# Patient Record
Sex: MALE | Race: BLACK OR AFRICAN AMERICAN | NOT HISPANIC OR LATINO | Employment: FULL TIME | URBAN - METROPOLITAN AREA
[De-identification: names, ages, dates, MRNs, and addresses within clinical notes are randomized per-mention and may not be internally consistent; named-entity substitution may affect disease eponyms.]

---

## 2018-05-17 ENCOUNTER — OFFICE VISIT (OUTPATIENT)
Dept: FAMILY MEDICINE | Facility: CLINIC | Age: 43
End: 2018-05-17
Payer: COMMERCIAL

## 2018-05-17 VITALS
BODY MASS INDEX: 26.61 KG/M2 | HEIGHT: 71 IN | DIASTOLIC BLOOD PRESSURE: 104 MMHG | TEMPERATURE: 98 F | SYSTOLIC BLOOD PRESSURE: 152 MMHG | OXYGEN SATURATION: 98 % | HEART RATE: 70 BPM | WEIGHT: 190.06 LBS

## 2018-05-17 DIAGNOSIS — Z13.220 SCREENING CHOLESTEROL LEVEL: ICD-10-CM

## 2018-05-17 DIAGNOSIS — Z13.29 THYROID DISORDER SCREEN: ICD-10-CM

## 2018-05-17 DIAGNOSIS — Z13.0 SCREENING FOR DEFICIENCY ANEMIA: ICD-10-CM

## 2018-05-17 DIAGNOSIS — Z13.1 DIABETES MELLITUS SCREENING: ICD-10-CM

## 2018-05-17 DIAGNOSIS — R01.1 SYSTOLIC MURMUR: ICD-10-CM

## 2018-05-17 DIAGNOSIS — I10 ESSENTIAL HYPERTENSION: Primary | ICD-10-CM

## 2018-05-17 PROCEDURE — 3080F DIAST BP >= 90 MM HG: CPT | Mod: CPTII,S$GLB,, | Performed by: NURSE PRACTITIONER

## 2018-05-17 PROCEDURE — 99203 OFFICE O/P NEW LOW 30 MIN: CPT | Mod: S$GLB,,, | Performed by: NURSE PRACTITIONER

## 2018-05-17 PROCEDURE — 3077F SYST BP >= 140 MM HG: CPT | Mod: CPTII,S$GLB,, | Performed by: NURSE PRACTITIONER

## 2018-05-17 PROCEDURE — 99999 PR PBB SHADOW E&M-EST. PATIENT-LVL IV: CPT | Mod: PBBFAC,,, | Performed by: NURSE PRACTITIONER

## 2018-05-17 PROCEDURE — 3008F BODY MASS INDEX DOCD: CPT | Mod: CPTII,S$GLB,, | Performed by: NURSE PRACTITIONER

## 2018-05-17 RX ORDER — AMLODIPINE BESYLATE 10 MG/1
10 TABLET ORAL DAILY
Qty: 30 TABLET | Refills: 0 | Status: SHIPPED | OUTPATIENT
Start: 2018-05-17 | End: 2018-06-20 | Stop reason: SDUPTHER

## 2018-05-17 RX ORDER — AMLODIPINE BESYLATE 10 MG/1
10 TABLET ORAL DAILY
COMMUNITY
End: 2018-05-17 | Stop reason: SDUPTHER

## 2018-05-17 RX ORDER — LISINOPRIL AND HYDROCHLOROTHIAZIDE 10; 12.5 MG/1; MG/1
1 TABLET ORAL DAILY
Qty: 30 TABLET | Refills: 0 | Status: SHIPPED | OUTPATIENT
Start: 2018-05-17 | End: 2018-06-20 | Stop reason: SDUPTHER

## 2018-05-17 NOTE — PROGRESS NOTES
"Subjective:       Patient ID: Salvador Pierce is a 43 y.o. male.    Chief Complaint: Establish Care and Medication Refill    ####NEW PATIENT####    Patient is a 43-year-old black male with hypertension and systolic murmur that is here today to establish care and get blood pressure check.    Patient has hypertension and currently takes amlodipine 10 mg daily.  Blood pressure is uncontrolled.  BP (!) 152/104   Pulse 70   Temp 98.4 °F (36.9 °C) (Oral)   Ht 5' 11" (1.803 m)   Wt 86.2 kg (190 lb 0.6 oz)   SpO2 98%   BMI 26.50 kg/m²                 Previous Medications    AMLODIPINE (NORVASC) 10 MG TABLET    Take 10 mg by mouth once daily.       Past Medical History:   Diagnosis Date    Hypertension     Systolic murmur     reports noted as a child - had evaluated by cardiologist in the past       History reviewed. No pertinent surgical history.    Family History   Problem Relation Age of Onset    Hypertension Mother     Multiple sclerosis Mother     Diabetes Father     Hypertension Father     No Known Problems Brother        Social History     Social History    Marital status:      Spouse name: N/A    Number of children: N/A    Years of education: N/A     Occupational History    supervisory position      Social History Main Topics    Smoking status: Former Smoker     Packs/day: 1.00     Years: 21.00     Quit date: 2/17/2018    Smokeless tobacco: Never Used    Alcohol use Yes      Comment: every other weekend - 3 mixed drinks per occasion    Drug use: No    Sexual activity: Yes     Partners: Female     Other Topics Concern    None     Social History Narrative    None       Review of Systems   Constitutional: Negative for activity change, appetite change, fatigue, fever and unexpected weight change.   HENT: Negative for congestion, ear pain, mouth sores, nosebleeds, postnasal drip, rhinorrhea, sinus pressure, sneezing, sore throat, trouble swallowing and voice change.    Eyes: Negative.  " "  Respiratory: Negative for cough, chest tightness and shortness of breath.    Cardiovascular: Negative for chest pain, palpitations and leg swelling.   Gastrointestinal: Negative.  Negative for abdominal pain, blood in stool, constipation, diarrhea, nausea and vomiting.   Endocrine: Negative.    Genitourinary: Negative for difficulty urinating, dysuria, flank pain, hematuria and urgency.   Musculoskeletal: Negative for arthralgias, back pain, gait problem, joint swelling, myalgias and neck pain.   Skin: Negative for color change, rash and wound.   Allergic/Immunologic: Negative for immunocompromised state.   Neurological: Negative for dizziness, tremors, seizures, syncope, speech difficulty and headaches.   Hematological: Negative for adenopathy. Does not bruise/bleed easily.   Psychiatric/Behavioral: Negative for behavioral problems, dysphoric mood, sleep disturbance and suicidal ideas. The patient is not nervous/anxious.          Objective:     Vitals:    05/17/18 0819 05/17/18 0837   BP: (!) 160/110 (!) 152/104   BP Location: Left arm    Patient Position: Sitting    BP Method: Medium (Manual)    Pulse: 70    Temp: 98.4 °F (36.9 °C)    TempSrc: Oral    SpO2: 98%    Weight: 86.2 kg (190 lb 0.6 oz)    Height: 5' 11" (1.803 m)           Physical Exam   Constitutional: He is oriented to person, place, and time. He appears well-developed and well-nourished.   HENT:   Head: Normocephalic.   Right Ear: External ear normal.   Left Ear: External ear normal.   Nose: Nose normal.   Mouth/Throat: Oropharynx is clear and moist. No oropharyngeal exudate.   Eyes: EOM are normal. Pupils are equal, round, and reactive to light. Right eye exhibits no discharge. Left eye exhibits no discharge. No scleral icterus.   Neck: Normal range of motion. Neck supple. No tracheal deviation present. No thyromegaly present.   Cardiovascular: Normal rate and regular rhythm.    Murmur heard.   Systolic murmur is present with a grade of 1/6 "   Murmur to right sternal border   Pulmonary/Chest: Effort normal and breath sounds normal. No respiratory distress.   Abdominal: Soft. He exhibits no distension.   Musculoskeletal: Normal range of motion. He exhibits no edema.   Lymphadenopathy:     He has no cervical adenopathy.   Neurological: He is alert and oriented to person, place, and time. Coordination normal.   Skin: Skin is warm and dry. No rash noted.   Psychiatric: He has a normal mood and affect. His behavior is normal.         Assessment:         ICD-10-CM ICD-9-CM   1. Essential hypertension I10 401.9   2. Screening for deficiency anemia Z13.0 V78.1   3. Thyroid disorder screen Z13.29 V77.0   4. Screening cholesterol level Z13.220 V77.91   5. Diabetes mellitus screening Z13.1 V77.1   6. Systolic murmur R01.1 785.2       Plan:       Essential hypertension  -  Start lisinopril HCT 10/12.5 mg daily.  Continue amlodipine 10 mg daily.  Get fasting labs and follow-up in 4 weeks.  -     lisinopril-hydrochlorothiazide (PRINZIDE,ZESTORETIC) 10-12.5 mg per tablet; Take 1 tablet by mouth once daily.  Dispense: 30 tablet; Refill: 0  -     amLODIPine (NORVASC) 10 MG tablet; Take 1 tablet (10 mg total) by mouth once daily.  Dispense: 30 tablet; Refill: 0    Screening for deficiency anemia  -     CBC auto differential; Future; Expected date: 06/07/2018    Thyroid disorder screen  -     TSH; Future; Expected date: 06/07/2018    Screening cholesterol level  -     Lipid panel; Future; Expected date: 06/07/2018    Diabetes mellitus screening  -     Comprehensive metabolic panel; Future; Expected date: 06/07/2018    Systolic murmur  -  Grade 1/6 systolic murmur noted.  According to cardiology notes in 2014, murmur is unchanged.      Follow-up in about 4 weeks (around 6/14/2018) for fasting labs and WELLNESS EXAM.     Patient's Medications   New Prescriptions    LISINOPRIL-HYDROCHLOROTHIAZIDE (PRINZIDE,ZESTORETIC) 10-12.5 MG PER TABLET    Take 1 tablet by mouth once  daily.   Previous Medications    No medications on file   Modified Medications    Modified Medication Previous Medication    AMLODIPINE (NORVASC) 10 MG TABLET amLODIPine (NORVASC) 10 MG tablet       Take 1 tablet (10 mg total) by mouth once daily.    Take 10 mg by mouth once daily.   Discontinued Medications    AMLODIPINE (NORVASC) 10 MG TABLET    Take 1 tablet (10 mg total) by mouth once daily.    LISINOPRIL-HYDROCHLOROTHIAZIDE (PRINZIDE,ZESTORETIC) 20-25 MG TAB    Take 1 tablet by mouth once daily.

## 2018-06-20 ENCOUNTER — OFFICE VISIT (OUTPATIENT)
Dept: FAMILY MEDICINE | Facility: CLINIC | Age: 43
End: 2018-06-20
Payer: COMMERCIAL

## 2018-06-20 VITALS
OXYGEN SATURATION: 99 % | HEART RATE: 72 BPM | SYSTOLIC BLOOD PRESSURE: 114 MMHG | DIASTOLIC BLOOD PRESSURE: 74 MMHG | WEIGHT: 188.25 LBS | TEMPERATURE: 98 F | BODY MASS INDEX: 26.35 KG/M2 | HEIGHT: 71 IN

## 2018-06-20 DIAGNOSIS — I10 ESSENTIAL HYPERTENSION: ICD-10-CM

## 2018-06-20 DIAGNOSIS — D64.9 MILD ANEMIA: ICD-10-CM

## 2018-06-20 DIAGNOSIS — Z00.00 ANNUAL PHYSICAL EXAM: Primary | ICD-10-CM

## 2018-06-20 DIAGNOSIS — R01.1 SYSTOLIC MURMUR: ICD-10-CM

## 2018-06-20 PROCEDURE — 99396 PREV VISIT EST AGE 40-64: CPT | Mod: S$GLB,,, | Performed by: NURSE PRACTITIONER

## 2018-06-20 PROCEDURE — 99999 PR PBB SHADOW E&M-EST. PATIENT-LVL IV: CPT | Mod: PBBFAC,,, | Performed by: NURSE PRACTITIONER

## 2018-06-20 PROCEDURE — 3078F DIAST BP <80 MM HG: CPT | Mod: CPTII,S$GLB,, | Performed by: NURSE PRACTITIONER

## 2018-06-20 PROCEDURE — 3074F SYST BP LT 130 MM HG: CPT | Mod: CPTII,S$GLB,, | Performed by: NURSE PRACTITIONER

## 2018-06-20 RX ORDER — AMLODIPINE BESYLATE 10 MG/1
10 TABLET ORAL DAILY
Qty: 90 TABLET | Refills: 0 | Status: SHIPPED | OUTPATIENT
Start: 2018-06-20 | End: 2019-04-03 | Stop reason: SDUPTHER

## 2018-06-20 RX ORDER — LISINOPRIL AND HYDROCHLOROTHIAZIDE 10; 12.5 MG/1; MG/1
1 TABLET ORAL DAILY
Qty: 90 TABLET | Refills: 0 | Status: SHIPPED | OUTPATIENT
Start: 2018-06-20 | End: 2019-07-31 | Stop reason: SDUPTHER

## 2018-06-20 NOTE — PROGRESS NOTES
"Subjective:       Patient ID: Salvador Pierce is a 43 y.o. male.    Chief Complaint: Annual Exam (wellness)    Patient is a 43-year-old black male with hypertension and systolic murmur that is here today for annual physical exam with fasting lab results.  Patient had his fasting labs drawn this morning so not all results back yet.    Patient has hypertension.  At last visit, we started patient on lisinopril HCT 10/12.5 mg in addition to amlodipine 10 mg daily that patient was already taking.  Blood pressure improved from 152/104 down to 114/74.  /74   Pulse 72   Temp 98 °F (36.7 °C) (Oral)   Ht 5' 11" (1.803 m)   Wt 85.4 kg (188 lb 4.4 oz)   SpO2 99%   BMI 26.26 kg/m²     Wellness labs (some are still in progress):  -  CBC showed a very mild anemia to the red blood cell and hemoglobin counts.  Advised patient on multivitamin with iron tablet  -  CMP was within normal limits  -  Lipid panel is still in progress  -  TSH is still in progress    Health maintenance:  -  Reports had tetanus vaccine at [facility] around February 2017.  We'll request records      Lab Visit on 06/20/2018   Component Date Value Ref Range Status    WBC 06/20/2018 7.03  3.90 - 12.70 K/uL Final    RBC 06/20/2018 4.49* 4.60 - 6.20 M/uL Final    Hemoglobin 06/20/2018 13.9* 14.0 - 18.0 g/dL Final    Hematocrit 06/20/2018 41.3  40.0 - 54.0 % Final    MCV 06/20/2018 92  82 - 98 fL Final    MCH 06/20/2018 31.0  27.0 - 31.0 pg Final    MCHC 06/20/2018 33.7  32.0 - 36.0 g/dL Final    RDW 06/20/2018 14.4  11.5 - 14.5 % Final    Platelets 06/20/2018 169  150 - 350 K/uL Final    MPV 06/20/2018 11.6  9.2 - 12.9 fL Final    Gran # (ANC) 06/20/2018 3.4  1.8 - 7.7 K/uL Final    Lymph # 06/20/2018 2.8  1.0 - 4.8 K/uL Final    Mono # 06/20/2018 0.6  0.3 - 1.0 K/uL Final    Eos # 06/20/2018 0.1  0.0 - 0.5 K/uL Final    Baso # 06/20/2018 0.04  0.00 - 0.20 K/uL Final    Gran% 06/20/2018 48.5  38.0 - 73.0 % Final    Lymph% 06/20/2018 " 40.0  18.0 - 48.0 % Final    Mono% 06/20/2018 9.1  4.0 - 15.0 % Final    Eosinophil% 06/20/2018 1.8  0.0 - 8.0 % Final    Basophil% 06/20/2018 0.6  0.0 - 1.9 % Final    Differential Method 06/20/2018 Automated   Final    Sodium 06/20/2018 144  136 - 145 mmol/L Final    Potassium 06/20/2018 4.1  3.5 - 5.1 mmol/L Final    Chloride 06/20/2018 106  95 - 110 mmol/L Final    CO2 06/20/2018 29  23 - 29 mmol/L Final    Glucose 06/20/2018 101  70 - 110 mg/dL Final    BUN, Bld 06/20/2018 17  2 - 20 mg/dL Final    Creatinine 06/20/2018 1.12  0.50 - 1.40 mg/dL Final    Calcium 06/20/2018 9.3  8.7 - 10.5 mg/dL Final    Total Protein 06/20/2018 7.9  6.0 - 8.4 g/dL Final    Albumin 06/20/2018 4.4  3.5 - 5.2 g/dL Final    Total Bilirubin 06/20/2018 0.2  0.1 - 1.0 mg/dL Final    Comment: For infants and newborns, interpretation of results should be based  on gestational age, weight and in agreement with clinical  observations.  Premature Infant recommended reference ranges:  Up to 24 hours.............<8.0 mg/dL  Up to 48 hours............<12.0 mg/dL  3-5 days..................<15.0 mg/dL  6-29 days.................<15.0 mg/dL      Alkaline Phosphatase 06/20/2018 60  38 - 126 U/L Final    AST 06/20/2018 29  15 - 46 U/L Final    ALT 06/20/2018 23  10 - 44 U/L Final    Anion Gap 06/20/2018 9  8 - 16 mmol/L Final    eGFR if African American 06/20/2018 >60.0  >60 mL/min/1.73 m^2 Final    eGFR if non African American 06/20/2018 >60.0  >60 mL/min/1.73 m^2 Final    Comment: Calculation used to obtain the estimated glomerular filtration  rate (eGFR) is the CKD-EPI equation.          Previous Medications    AMLODIPINE (NORVASC) 10 MG TABLET    Take 1 tablet (10 mg total) by mouth once daily.    LISINOPRIL-HYDROCHLOROTHIAZIDE (PRINZIDE,ZESTORETIC) 10-12.5 MG PER TABLET    Take 1 tablet by mouth once daily.       Past Medical History:   Diagnosis Date    Hypertension     Systolic murmur     reports noted as a child - had  evaluated by cardiologist in the past       History reviewed. No pertinent surgical history.    Family History   Problem Relation Age of Onset    Hypertension Mother     Multiple sclerosis Mother     Diabetes Father     Hypertension Father     No Known Problems Brother        Social History     Social History    Marital status:      Spouse name: N/A    Number of children: N/A    Years of education: N/A     Occupational History    supervisory position      Social History Main Topics    Smoking status: Former Smoker     Packs/day: 1.00     Years: 21.00     Quit date: 2/17/2018    Smokeless tobacco: Never Used    Alcohol use Yes      Comment: every other weekend - 3 mixed drinks per occasion    Drug use: No    Sexual activity: Yes     Partners: Female     Other Topics Concern    None     Social History Narrative    None       Review of Systems   Constitutional: Negative for activity change, appetite change, fatigue, fever and unexpected weight change.   HENT: Negative for congestion, ear pain, mouth sores, nosebleeds, postnasal drip, rhinorrhea, sinus pressure, sneezing, sore throat, trouble swallowing and voice change.    Eyes: Negative.    Respiratory: Negative for cough, chest tightness and shortness of breath.    Cardiovascular: Negative for chest pain, palpitations and leg swelling.   Gastrointestinal: Negative.  Negative for abdominal pain, blood in stool, constipation, diarrhea, nausea and vomiting.   Endocrine: Negative.    Genitourinary: Negative for difficulty urinating, dysuria, flank pain, hematuria and urgency.   Musculoskeletal: Negative for arthralgias, back pain, gait problem, joint swelling, myalgias and neck pain.   Skin: Negative for color change, rash and wound.   Allergic/Immunologic: Negative for immunocompromised state.   Neurological: Negative for dizziness, tremors, seizures, syncope, speech difficulty and headaches.   Hematological: Negative for adenopathy. Does not  "bruise/bleed easily.   Psychiatric/Behavioral: Negative for behavioral problems, dysphoric mood, sleep disturbance and suicidal ideas. The patient is not nervous/anxious.          Objective:     Vitals:    06/20/18 0957 06/20/18 1010   BP: 120/80 114/74   BP Location: Right arm    Patient Position: Sitting    BP Method: Medium (Manual)    Pulse: 72    Temp: 98 °F (36.7 °C)    TempSrc: Oral    SpO2: 99%    Weight: 85.4 kg (188 lb 4.4 oz)    Height: 5' 11" (1.803 m)           Physical Exam   Constitutional: He is oriented to person, place, and time. He appears well-developed and well-nourished.   HENT:   Head: Normocephalic.   Right Ear: External ear normal.   Left Ear: External ear normal.   Nose: Nose normal.   Mouth/Throat: Oropharynx is clear and moist. No oropharyngeal exudate.   Eyes: EOM are normal. Pupils are equal, round, and reactive to light. Right eye exhibits no discharge. Left eye exhibits no discharge. No scleral icterus.   Neck: Normal range of motion. Neck supple. No tracheal deviation present. No thyromegaly present.   Cardiovascular: Normal rate and regular rhythm.    Murmur heard.   Systolic murmur is present with a grade of 1/6   Murmur to right sternal border   Pulmonary/Chest: Effort normal and breath sounds normal. No respiratory distress.   Abdominal: Soft. He exhibits no distension.   Musculoskeletal: Normal range of motion. He exhibits no edema.   Lymphadenopathy:     He has no cervical adenopathy.   Neurological: He is alert and oriented to person, place, and time. Coordination normal.   Skin: Skin is warm and dry. No rash noted.   Psychiatric: He has a normal mood and affect. His behavior is normal.         Assessment:         ICD-10-CM ICD-9-CM   1. Annual physical exam Z00.00 V70.0   2. Essential hypertension I10 401.9   3. Systolic murmur R01.1 785.2   4. Mild anemia D64.9 285.9       Plan:       Annual physical exam  -  I will request records from [facility] or tetanus vaccine " administration.  -  I will send fasting lab results for cholesterol and thyroid to patient portal if normal.  We'll call with abnormalities.    Essential hypertension  -  Controlled on medications below.  Recheck in 3 months.  -     lisinopril-hydrochlorothiazide (PRINZIDE,ZESTORETIC) 10-12.5 mg per tablet; Take 1 tablet by mouth once daily.  Dispense: 90 tablet; Refill: 0  -     amLODIPine (NORVASC) 10 MG tablet; Take 1 tablet (10 mg total) by mouth once daily.  Dispense: 90 tablet; Refill: 0    Systolic murmur  -  Very faint murmur present with no changes.    Mild anemia  -  Take multivitamin with iron      Follow-up in about 3 months (around 9/20/2018) for blood pressure check.     Patient's Medications   New Prescriptions    No medications on file   Previous Medications    No medications on file   Modified Medications    Modified Medication Previous Medication    AMLODIPINE (NORVASC) 10 MG TABLET amLODIPine (NORVASC) 10 MG tablet       Take 1 tablet (10 mg total) by mouth once daily.    Take 1 tablet (10 mg total) by mouth once daily.    LISINOPRIL-HYDROCHLOROTHIAZIDE (PRINZIDE,ZESTORETIC) 10-12.5 MG PER TABLET lisinopril-hydrochlorothiazide (PRINZIDE,ZESTORETIC) 10-12.5 mg per tablet       Take 1 tablet by mouth once daily.    Take 1 tablet by mouth once daily.   Discontinued Medications    No medications on file

## 2018-06-27 ENCOUNTER — TELEPHONE (OUTPATIENT)
Dept: ADMINISTRATIVE | Facility: HOSPITAL | Age: 43
End: 2018-06-27

## 2018-06-27 NOTE — TELEPHONE ENCOUNTER
Contacted pt in reference of tetanus vaccine. Records were received from [facility] but no record of tetanus administration.  Pt states he does not know of any other places he may have received the vaccine.   Pt states he will receive the vaccine the day of his next visit.

## 2018-07-18 ENCOUNTER — PATIENT MESSAGE (OUTPATIENT)
Dept: ADMINISTRATIVE | Facility: OTHER | Age: 43
End: 2018-07-18

## 2018-10-03 ENCOUNTER — OFFICE VISIT (OUTPATIENT)
Dept: FAMILY MEDICINE | Facility: CLINIC | Age: 43
End: 2018-10-03
Payer: COMMERCIAL

## 2018-10-03 VITALS
WEIGHT: 188.69 LBS | SYSTOLIC BLOOD PRESSURE: 128 MMHG | HEART RATE: 78 BPM | DIASTOLIC BLOOD PRESSURE: 78 MMHG | BODY MASS INDEX: 26.42 KG/M2 | OXYGEN SATURATION: 99 % | HEIGHT: 71 IN | TEMPERATURE: 98 F

## 2018-10-03 DIAGNOSIS — R79.89 ABNORMAL TSH: ICD-10-CM

## 2018-10-03 DIAGNOSIS — D64.9 MILD ANEMIA: ICD-10-CM

## 2018-10-03 DIAGNOSIS — Z23 NEED FOR TDAP VACCINATION: ICD-10-CM

## 2018-10-03 DIAGNOSIS — Z23 FLU VACCINE NEED: ICD-10-CM

## 2018-10-03 DIAGNOSIS — R01.1 SYSTOLIC MURMUR: ICD-10-CM

## 2018-10-03 DIAGNOSIS — I10 ESSENTIAL HYPERTENSION: Primary | ICD-10-CM

## 2018-10-03 PROCEDURE — 90715 TDAP VACCINE 7 YRS/> IM: CPT | Mod: S$GLB,,, | Performed by: NURSE PRACTITIONER

## 2018-10-03 PROCEDURE — 3008F BODY MASS INDEX DOCD: CPT | Mod: CPTII,S$GLB,, | Performed by: NURSE PRACTITIONER

## 2018-10-03 PROCEDURE — 99214 OFFICE O/P EST MOD 30 MIN: CPT | Mod: 25,S$GLB,, | Performed by: NURSE PRACTITIONER

## 2018-10-03 PROCEDURE — 3078F DIAST BP <80 MM HG: CPT | Mod: CPTII,S$GLB,, | Performed by: NURSE PRACTITIONER

## 2018-10-03 PROCEDURE — 90686 IIV4 VACC NO PRSV 0.5 ML IM: CPT | Mod: S$GLB,,, | Performed by: NURSE PRACTITIONER

## 2018-10-03 PROCEDURE — 90472 IMMUNIZATION ADMIN EACH ADD: CPT | Mod: S$GLB,,, | Performed by: NURSE PRACTITIONER

## 2018-10-03 PROCEDURE — 90471 IMMUNIZATION ADMIN: CPT | Mod: S$GLB,,, | Performed by: NURSE PRACTITIONER

## 2018-10-03 PROCEDURE — 3074F SYST BP LT 130 MM HG: CPT | Mod: CPTII,S$GLB,, | Performed by: NURSE PRACTITIONER

## 2018-10-03 PROCEDURE — 99999 PR PBB SHADOW E&M-EST. PATIENT-LVL IV: CPT | Mod: PBBFAC,,, | Performed by: NURSE PRACTITIONER

## 2018-10-03 NOTE — PROGRESS NOTES
"Subjective:       Patient ID: Salvador Pierce is a 43 y.o. male.    Chief Complaint: Follow-up (blood pressuer check)    Patient is a 43-year-old black male with hypertension, systolic murmur, mild anemia noted on wellness exam June 2018, an an abnormal TSH noted on wellness exam in June 2018 that is here today for follow-up.    Patient has hypertension and currently taking amlodipine 10 mg daily and lisinopril HCT 10/12.5 mg daily.  Blood pressure is controlled on present medications.  /78   Pulse 78   Temp 98 °F (36.7 °C) (Oral)   Ht 5' 11" (1.803 m)   Wt 85.6 kg (188 lb 11.4 oz)   SpO2 99%   BMI 26.32 kg/m²     Patient has a very mild grade 1/6 systolic murmur noted as a child and had evaluated by cardiologist in the past without any acute abnormalities.    Patient has a mild anemia noted on wellness exam in June 2018.  His red blood cell and hemoglobin were very minimally low and had no signs of blood loss.  Advised patient on a multivitamin and due to recheck a CBC level.  Advised patient we will get blood work this week and I will send results over patient portal.    Patient had an abnormal TSH on wellness exam in June 2018.  During exam, the TSH lab was still pending to patient was notified of result over portal in June 2018.  His TSH was mildly low at 0.145 but his free T4 was within normal limits and patient denied any hyperthyroid symptoms at the time.  His thyroid exam was unremarkable germ physical exam at wellness.  Advised patient we would monitor and recheck his thyroid levels.  I will recheck thyroid levels this week and will send results over patient portal.      Hypertension   This is a chronic problem. The current episode started more than 1 year ago. The problem has been gradually improving since onset. The problem is controlled. Pertinent negatives include no anxiety, blurred vision, chest pain, headaches, malaise/fatigue, neck pain, orthopnea, palpitations, peripheral edema, PND, " shortness of breath or sweats. There are no associated agents to hypertension. Risk factors for coronary artery disease include family history. Past treatments include ACE inhibitors, calcium channel blockers, lifestyle changes and diuretics. The current treatment provides significant improvement. There are no compliance problems.      Component      Latest Ref Rng & Units 6/20/2018   WBC      3.90 - 12.70 K/uL 7.03   RBC      4.60 - 6.20 M/uL 4.49 (L)   Hemoglobin      14.0 - 18.0 g/dL 13.9 (L)   Hematocrit      40.0 - 54.0 % 41.3   MCV      82 - 98 fL 92   MCH      27.0 - 31.0 pg 31.0   MCHC      32.0 - 36.0 g/dL 33.7   RDW      11.5 - 14.5 % 14.4   Platelets      150 - 350 K/uL 169   MPV      9.2 - 12.9 fL 11.6   Gran # (ANC)      1.8 - 7.7 K/uL 3.4   Lymph #      1.0 - 4.8 K/uL 2.8   Mono #      0.3 - 1.0 K/uL 0.6   Eos #      0.0 - 0.5 K/uL 0.1   Baso #      0.00 - 0.20 K/uL 0.04   Gran%      38.0 - 73.0 % 48.5   Lymph%      18.0 - 48.0 % 40.0   Mono%      4.0 - 15.0 % 9.1   Eosinophil%      0.0 - 8.0 % 1.8   Basophil%      0.0 - 1.9 % 0.6   Differential Method       Automated   Sodium      136 - 145 mmol/L 144   Potassium      3.5 - 5.1 mmol/L 4.1   Chloride      95 - 110 mmol/L 106   CO2      23 - 29 mmol/L 29   Glucose      70 - 110 mg/dL 101   BUN, Bld      2 - 20 mg/dL 17   Creatinine      0.50 - 1.40 mg/dL 1.12   Calcium      8.7 - 10.5 mg/dL 9.3   Total Protein      6.0 - 8.4 g/dL 7.9   Albumin      3.5 - 5.2 g/dL 4.4   Total Bilirubin      0.1 - 1.0 mg/dL 0.2   Alkaline Phosphatase      38 - 126 U/L 60   AST      15 - 46 U/L 29   ALT      10 - 44 U/L 23   Anion Gap      8 - 16 mmol/L 9   eGFR if African American      >60 mL/min/1.73 m:2 >60.0   eGFR if non African American      >60 mL/min/1.73 m:2 >60.0   Cholesterol      120 - 199 mg/dL 167   Triglycerides      30 - 150 mg/dL 89   HDL      40 - 75 mg/dL 44   LDL Cholesterol      63.0 - 159.0 mg/dL 105.2   HDL/Chol Ratio      20.0 - 50.0 % 26.3   Total  Cholesterol/HDL Ratio      2.0 - 5.0 3.8   Non-HDL Cholesterol      mg/dL 123   TSH      0.400 - 4.000 uIU/mL 0.145 (L)   Free T4      0.71 - 1.51 ng/dL 1.03     Current Outpatient Medications   Medication Sig Dispense Refill    amLODIPine (NORVASC) 10 MG tablet Take 1 tablet (10 mg total) by mouth once daily. 90 tablet 0    lisinopril-hydrochlorothiazide (PRINZIDE,ZESTORETIC) 10-12.5 mg per tablet Take 1 tablet by mouth once daily. 90 tablet 0    multivitamin capsule Take 1 capsule by mouth once daily.       No current facility-administered medications for this visit.        Past Medical History:   Diagnosis Date    Abnormal TSH 2018    Patient had a mildly low TSH in 2018 but normal free T4 and asymtomatic.  Will continue to monitor levels    Hypertension     Mild anemia 2018    noted on wellness - started on multivitamin    Systolic murmur     reports noted as a child - had evaluated by cardiologist in the past       History reviewed. No pertinent surgical history.    Family History   Problem Relation Age of Onset    Hypertension Mother     Multiple sclerosis Mother     Diabetes Father     Hypertension Father     No Known Problems Brother        Social History     Socioeconomic History    Marital status:      Spouse name: None    Number of children: None    Years of education: None    Highest education level: None   Social Needs    Financial resource strain: None    Food insecurity - worry: None    Food insecurity - inability: None    Transportation needs - medical: None    Transportation needs - non-medical: None   Occupational History    Occupation: supervisory position   Tobacco Use    Smoking status: Former Smoker     Packs/day: 1.00     Years: 21.00     Pack years: 21.00     Last attempt to quit: 2018     Years since quittin.6    Smokeless tobacco: Never Used   Substance and Sexual Activity    Alcohol use: Yes     Comment: every other weekend - 3 mixed  "drinks per occasion    Drug use: No    Sexual activity: Yes     Partners: Female   Other Topics Concern    None   Social History Narrative    None       Review of Systems   Constitutional: Negative for activity change, appetite change, fatigue, fever, malaise/fatigue and unexpected weight change.   HENT: Negative for congestion, ear pain, mouth sores, nosebleeds, postnasal drip, rhinorrhea, sinus pressure, sneezing, sore throat, trouble swallowing and voice change.    Eyes: Negative.  Negative for blurred vision.   Respiratory: Negative for cough, chest tightness and shortness of breath.    Cardiovascular: Negative for chest pain, palpitations, orthopnea, leg swelling and PND.   Gastrointestinal: Negative.  Negative for abdominal pain, blood in stool, constipation, diarrhea, nausea and vomiting.   Endocrine: Negative.    Genitourinary: Negative for difficulty urinating, dysuria, flank pain, hematuria and urgency.   Musculoskeletal: Negative for arthralgias, back pain, gait problem, joint swelling, myalgias and neck pain.   Skin: Negative for color change, rash and wound.   Allergic/Immunologic: Negative for immunocompromised state.   Neurological: Negative for dizziness, tremors, seizures, syncope, speech difficulty and headaches.   Hematological: Negative for adenopathy. Does not bruise/bleed easily.   Psychiatric/Behavioral: Negative for behavioral problems, dysphoric mood, sleep disturbance and suicidal ideas. The patient is not nervous/anxious.          Objective:     Vitals:    10/03/18 1045 10/03/18 1101   BP: 130/86 128/78   BP Location: Right arm    Patient Position: Sitting    BP Method: Medium (Manual)    Pulse: 78    Temp: 98 °F (36.7 °C)    TempSrc: Oral    SpO2: 99%    Weight: 85.6 kg (188 lb 11.4 oz)    Height: 5' 11" (1.803 m)           Physical Exam   Constitutional: He is oriented to person, place, and time. He appears well-developed and well-nourished.   HENT:   Head: Normocephalic.   Right " Ear: External ear normal.   Left Ear: External ear normal.   Nose: Nose normal.   Mouth/Throat: Oropharynx is clear and moist. No oropharyngeal exudate.   Eyes: EOM are normal. Pupils are equal, round, and reactive to light. Right eye exhibits no discharge. Left eye exhibits no discharge. No scleral icterus.   Neck: Normal range of motion. Neck supple. No JVD present. No tracheal deviation present. No thyroid mass and no thyromegaly present.   Cardiovascular: Normal rate and regular rhythm.   Murmur heard.   Systolic murmur is present with a grade of 1/6.  Murmur to right sternal border   Pulmonary/Chest: Effort normal and breath sounds normal. No respiratory distress.   Abdominal: Soft. He exhibits no distension.   Musculoskeletal: Normal range of motion. He exhibits no edema.   Lymphadenopathy:     He has no cervical adenopathy.   Neurological: He is alert and oriented to person, place, and time. Coordination normal.   Skin: Skin is warm and dry. No rash noted.   Psychiatric: He has a normal mood and affect. His behavior is normal.         Assessment:         ICD-10-CM ICD-9-CM   1. Essential hypertension I10 401.9   2. Systolic murmur R01.1 785.2   3. Abnormal TSH R79.89 790.6   4. Mild anemia D64.9 285.9   5. Need for Tdap vaccination Z23 V06.1   6. Flu vaccine need Z23 V04.81       Plan:       Essential hypertension  -  continue amlodipine and lisinopril HCT at present doses.  Will recheck in 6 months.  -  patient was interested and hypertension digital medicine program so patient was enrolled and advised someone with contact him over portal or by phone to discuss enrollment.  -     Hypertension Digital Medicine (HDMP) Enrollment Order    Systolic murmur  -  very mild and unchanged.    Abnormal TSH  -  mildly low TSH in June 2018 with a normal free T4 and no symptoms of hyperthyroidism.  He is due to recheck thyroid labs now.  -     TSH; Future; Expected date: 10/03/2018  -     T4, free; Future; Expected date:  10/03/2018    Mild anemia  -  mild anemia noted on wellness exam in June 2018.  Patient started taking multivitamin.  No signs of blood loss.  Will recheck CBC now.  -     CBC auto differential; Future; Expected date: 10/03/2018    Need for Tdap vaccination  -     Tdap Vaccine    Flu vaccine need  -     Influenza - Quadrivalent (3 years & older) (PF)        Patient will go get blood work this week and I will send results over patient portal.  Follow-up in about 6 months (around 4/3/2019) for blood pressure check .        Medication List           Accurate as of 10/3/18 12:43 PM. If you have any questions, ask your nurse or doctor.               CONTINUE taking these medications    amLODIPine 10 MG tablet  Commonly known as:  NORVASC  Take 1 tablet (10 mg total) by mouth once daily.     lisinopril-hydrochlorothiazide 10-12.5 mg per tablet  Commonly known as:  PRINZIDE,ZESTORETIC  Take 1 tablet by mouth once daily.     multivitamin capsule

## 2018-10-19 ENCOUNTER — TELEPHONE (OUTPATIENT)
Dept: FAMILY MEDICINE | Facility: CLINIC | Age: 43
End: 2018-10-19

## 2018-10-19 DIAGNOSIS — E05.90 SUBCLINICAL HYPERTHYROIDISM: Primary | ICD-10-CM

## 2018-10-19 NOTE — TELEPHONE ENCOUNTER
Message sent over patient portal:  our anemia has resolved.  You can continue the multivitamin daily.  Your TSH remains low but is improved from labs in June so I will continue to monitor.  We will get thyroid panel in 6 months when you return for blood pressure check.  Please have labs drawn prior to office visit. I will have Bertha contact you next week to set up a lab appointment prior to your visit in April.

## 2018-10-19 NOTE — TELEPHONE ENCOUNTER
Bertha - patient needs thyroid labs before his visit in April 2019.  I told him you would call him to set up lab appointment before his scheduled visit in April.

## 2019-01-24 ENCOUNTER — OFFICE VISIT (OUTPATIENT)
Dept: PODIATRY | Facility: CLINIC | Age: 44
End: 2019-01-24
Payer: COMMERCIAL

## 2019-01-24 VITALS
BODY MASS INDEX: 35.5 KG/M2 | HEART RATE: 79 BPM | WEIGHT: 188 LBS | HEIGHT: 61 IN | SYSTOLIC BLOOD PRESSURE: 131 MMHG | DIASTOLIC BLOOD PRESSURE: 80 MMHG

## 2019-01-24 DIAGNOSIS — M79.671 FOOT PAIN, BILATERAL: ICD-10-CM

## 2019-01-24 DIAGNOSIS — M24.573 EQUINUS CONTRACTURE OF ANKLE: ICD-10-CM

## 2019-01-24 DIAGNOSIS — M72.2 PLANTAR FIBROMATOSIS: Primary | ICD-10-CM

## 2019-01-24 DIAGNOSIS — M79.672 FOOT PAIN, BILATERAL: ICD-10-CM

## 2019-01-24 PROCEDURE — 99203 PR OFFICE/OUTPT VISIT, NEW, LEVL III, 30-44 MIN: ICD-10-PCS | Mod: 25,S$GLB,, | Performed by: PODIATRIST

## 2019-01-24 PROCEDURE — 99203 OFFICE O/P NEW LOW 30 MIN: CPT | Mod: 25,S$GLB,, | Performed by: PODIATRIST

## 2019-01-24 PROCEDURE — 29540 PR STRAPPING; ANKLE &/OR FOOT: ICD-10-PCS | Mod: 50,S$GLB,, | Performed by: PODIATRIST

## 2019-01-24 PROCEDURE — 99999 PR PBB SHADOW E&M-EST. PATIENT-LVL III: CPT | Mod: PBBFAC,,, | Performed by: PODIATRIST

## 2019-01-24 PROCEDURE — 3008F BODY MASS INDEX DOCD: CPT | Mod: CPTII,S$GLB,, | Performed by: PODIATRIST

## 2019-01-24 PROCEDURE — 3008F PR BODY MASS INDEX (BMI) DOCUMENTED: ICD-10-PCS | Mod: CPTII,S$GLB,, | Performed by: PODIATRIST

## 2019-01-24 PROCEDURE — 3075F SYST BP GE 130 - 139MM HG: CPT | Mod: CPTII,S$GLB,, | Performed by: PODIATRIST

## 2019-01-24 PROCEDURE — 3079F DIAST BP 80-89 MM HG: CPT | Mod: CPTII,S$GLB,, | Performed by: PODIATRIST

## 2019-01-24 PROCEDURE — 29540 STRAPPING ANKLE &/FOOT: CPT | Mod: 50,S$GLB,, | Performed by: PODIATRIST

## 2019-01-24 PROCEDURE — 3075F PR MOST RECENT SYSTOLIC BLOOD PRESS GE 130-139MM HG: ICD-10-PCS | Mod: CPTII,S$GLB,, | Performed by: PODIATRIST

## 2019-01-24 PROCEDURE — 99999 PR PBB SHADOW E&M-EST. PATIENT-LVL III: ICD-10-PCS | Mod: PBBFAC,,, | Performed by: PODIATRIST

## 2019-01-24 PROCEDURE — 3079F PR MOST RECENT DIASTOLIC BLOOD PRESSURE 80-89 MM HG: ICD-10-PCS | Mod: CPTII,S$GLB,, | Performed by: PODIATRIST

## 2019-01-24 RX ORDER — MELOXICAM 15 MG/1
15 TABLET ORAL DAILY
Qty: 30 TABLET | Refills: 0 | Status: SHIPPED | OUTPATIENT
Start: 2019-01-24 | End: 2019-04-03 | Stop reason: ALTCHOICE

## 2019-01-24 NOTE — PROGRESS NOTES
Subjective:      Patient ID: Salvador Pierce is a 43 y.o. male.    Chief Complaint: Plantar Fasciitis (bilateral)    Sharp throbbing intermittent pain bottom both arches with bump on each.  Gradual onset, worsening over past several weeks, aggravated by increased weight bearing, shoe gear, pressure.  No previous medical treatment.  OTC pain med not helping. Denies trauma, surgery.    Review of Systems   Constitution: Negative for chills, diaphoresis, fever, malaise/fatigue and night sweats.   Cardiovascular: Negative for claudication, cyanosis, leg swelling and syncope.   Skin: Negative for color change, dry skin, nail changes, rash, suspicious lesions and unusual hair distribution.   Musculoskeletal: Negative for falls, joint pain, joint swelling, muscle cramps, muscle weakness and stiffness.   Gastrointestinal: Negative for constipation, diarrhea, nausea and vomiting.   Neurological: Negative for brief paralysis, disturbances in coordination, focal weakness, numbness, paresthesias, sensory change and tremors.           Objective:      Physical Exam   Constitutional: He is oriented to person, place, and time. He appears well-developed and well-nourished. He is cooperative. No distress.   Cardiovascular:   Pulses:       Popliteal pulses are 2+ on the right side, and 2+ on the left side.        Dorsalis pedis pulses are 2+ on the right side, and 2+ on the left side.        Posterior tibial pulses are 2+ on the right side, and 2+ on the left side.   Capillary refill 3 seconds all toes/distal feet, all toes/both feet warm to touch.      Negative lymphadenopathy bilateral popliteal fossa and tarsal tunnel.      Negavie lower extremity edema bilateral.     Musculoskeletal:        Right ankle: He exhibits normal range of motion, no swelling, no ecchymosis, no deformity, no laceration and normal pulse. Achilles tendon normal. Achilles tendon exhibits no pain, no defect and normal Villavicencio's test results.   .Sharp deep pain to  palpation inferior mass in medial band of plantar fascia right and left - non mobile, without ecchymosis, erythema, edema, or cardinal signs infection, and no signs of trauma.    Ankle dorsiflexion decreased at <10 degrees bilateral with moderate increase with knee flexion bilateral.    Otherwise, Normal angle, base, station of gait. All ten toes without clubbing, cyanosis, or signs of ischemia.  No pain to palpation bilateral lower extremities.  Range of motion, stability, muscle strength, and muscle tone normal bilateral feet and legs.     Lymphadenopathy: No inguinal adenopathy noted on the right or left side.   Negative lymphadenopathy bilateral popliteal fossa and tarsal tunnel.    Negative lymphangitic streaking bilateral feet/ankles/legs.   Neurological: He is alert and oriented to person, place, and time. He has normal strength. He displays no atrophy and no tremor. No sensory deficit. He exhibits normal muscle tone. Gait normal.   Reflex Scores:       Patellar reflexes are 2+ on the right side and 2+ on the left side.       Achilles reflexes are 2+ on the right side and 2+ on the left side.  Negative tinel sign to percussion sural, superficial peroneal, deep peroneal, saphenous, and posterior tibial nerves right and left ankles and feet.     Skin: Skin is warm, dry and intact. Capillary refill takes 2 to 3 seconds. No abrasion, no bruising, no burn, no ecchymosis, no laceration, no lesion and no rash noted. He is not diaphoretic. No cyanosis or erythema. No pallor. Nails show no clubbing.     Skin is normal age and health appropriate color, turgor, texture, and temperature bilateral lower extremities without ulceration, hyperpigmentation, discoloration, masses nodules or cords palpated.  No ecchymosis, erythema, edema, or cardinal signs of infection bilateral lower extremities.     Psychiatric: He has a normal mood and affect.             Assessment:       Encounter Diagnoses   Name Primary?    Plantar  fibromatosis Yes    Foot pain, bilateral     Equinus contracture of ankle          Plan:       Salvador was seen today for plantar fasciitis.    Diagnoses and all orders for this visit:    Plantar fibromatosis  -     X-Ray Foot Complete Bilateral; Future    Foot pain, bilateral  -     X-Ray Foot Complete Bilateral; Future    Equinus contracture of ankle  -     X-Ray Foot Complete Bilateral; Future    Other orders  -     meloxicam (MOBIC) 15 MG tablet; Take 1 tablet (15 mg total) by mouth once daily.      I counseled the patient on his conditions, their implications and medical management.        Patient will stretch the tendo achilles complex three times daily as demonstrated in the office.  Literature was dispensed illustrating proper stretching technique.    I applied a plantar rest strapping to the patient's right and left foot to offload symptomatic area, support the arch, and relieve pain.    Patient will obtain over the counter arch supports and wear them in shoes whenever possible.  Athletic shoes intended for walking or running are usually best.    The patient was advised that NSAID-type medications have two very important potential side effects: gastrointestinal irritation including hemorrhage and renal injuries. He was asked to take the medication with food and to stop if he experiences any GI upset. I asked him to call for vomiting, abdominal pain or black/bloody stools. The patient expresses understanding of these issues and questions were answered.    Discussed conservative treatment with shoes of adequate dimensions, material, and style to alleviate symptoms and delay or prevent surgical intervention.    Rx meloxicam, xrays            Follow-up in about 1 month (around 2/24/2019).

## 2019-03-20 ENCOUNTER — HOSPITAL ENCOUNTER (OUTPATIENT)
Dept: RADIOLOGY | Facility: HOSPITAL | Age: 44
Discharge: HOME | End: 2019-03-20
Attending: PODIATRIST
Payer: COMMERCIAL

## 2019-03-20 ENCOUNTER — PATIENT MESSAGE (OUTPATIENT)
Dept: ADMINISTRATIVE | Facility: OTHER | Age: 44
End: 2019-03-20

## 2019-03-20 ENCOUNTER — OFFICE VISIT (OUTPATIENT)
Dept: PODIATRY | Facility: CLINIC | Age: 44
End: 2019-03-20
Payer: COMMERCIAL

## 2019-03-20 VITALS
BODY MASS INDEX: 35.5 KG/M2 | HEART RATE: 63 BPM | WEIGHT: 188 LBS | HEIGHT: 61 IN | SYSTOLIC BLOOD PRESSURE: 113 MMHG | DIASTOLIC BLOOD PRESSURE: 68 MMHG

## 2019-03-20 DIAGNOSIS — M79.671 FOOT PAIN, BILATERAL: ICD-10-CM

## 2019-03-20 DIAGNOSIS — M72.2 PLANTAR FIBROMATOSIS: Primary | ICD-10-CM

## 2019-03-20 DIAGNOSIS — M72.2 PLANTAR FIBROMATOSIS: ICD-10-CM

## 2019-03-20 DIAGNOSIS — M24.573 EQUINUS CONTRACTURE OF ANKLE: ICD-10-CM

## 2019-03-20 DIAGNOSIS — M79.672 FOOT PAIN, BILATERAL: ICD-10-CM

## 2019-03-20 PROCEDURE — 3074F SYST BP LT 130 MM HG: CPT | Mod: CPTII,S$GLB,, | Performed by: PODIATRIST

## 2019-03-20 PROCEDURE — 3008F BODY MASS INDEX DOCD: CPT | Mod: CPTII,S$GLB,, | Performed by: PODIATRIST

## 2019-03-20 PROCEDURE — 3078F DIAST BP <80 MM HG: CPT | Mod: CPTII,S$GLB,, | Performed by: PODIATRIST

## 2019-03-20 PROCEDURE — 73630 X-RAY EXAM OF FOOT: CPT | Mod: 26,,, | Performed by: RADIOLOGY

## 2019-03-20 PROCEDURE — 99212 OFFICE O/P EST SF 10 MIN: CPT | Mod: S$GLB,,, | Performed by: PODIATRIST

## 2019-03-20 PROCEDURE — 99999 PR PBB SHADOW E&M-EST. PATIENT-LVL III: ICD-10-PCS | Mod: PBBFAC,,, | Performed by: PODIATRIST

## 2019-03-20 PROCEDURE — 3074F PR MOST RECENT SYSTOLIC BLOOD PRESSURE < 130 MM HG: ICD-10-PCS | Mod: CPTII,S$GLB,, | Performed by: PODIATRIST

## 2019-03-20 PROCEDURE — 73630 X-RAY EXAM OF FOOT: CPT | Mod: 50,TC

## 2019-03-20 PROCEDURE — 73630 XR FOOT COMPLETE 3 VIEW BILATERAL: ICD-10-PCS | Mod: 26,,, | Performed by: RADIOLOGY

## 2019-03-20 PROCEDURE — 99999 PR PBB SHADOW E&M-EST. PATIENT-LVL III: CPT | Mod: PBBFAC,,, | Performed by: PODIATRIST

## 2019-03-20 PROCEDURE — 3078F PR MOST RECENT DIASTOLIC BLOOD PRESSURE < 80 MM HG: ICD-10-PCS | Mod: CPTII,S$GLB,, | Performed by: PODIATRIST

## 2019-03-20 PROCEDURE — 3008F PR BODY MASS INDEX (BMI) DOCUMENTED: ICD-10-PCS | Mod: CPTII,S$GLB,, | Performed by: PODIATRIST

## 2019-03-20 PROCEDURE — 99212 PR OFFICE/OUTPT VISIT, EST, LEVL II, 10-19 MIN: ICD-10-PCS | Mod: S$GLB,,, | Performed by: PODIATRIST

## 2019-03-20 NOTE — PROGRESS NOTES
Subjective:      Patient ID: Salvador Pierce is a 44 y.o. male.    Chief Complaint: Follow-up and Foot Pain (b/l)    Sharp throbbing intermittent pain bottom both arches with bump on each.  Gradual onset, improving over past several weeks, aggravated by increased weight bearing, shoe gear, pressure.  Nsaid, stretches, arch padding, accommodative shoes all help.  xrays negative acute injury. Denies trauma, surgery.    Review of Systems   Constitution: Negative for chills, diaphoresis, fever, malaise/fatigue and night sweats.   Cardiovascular: Negative for claudication, cyanosis, leg swelling and syncope.   Skin: Negative for color change, dry skin, nail changes, rash, suspicious lesions and unusual hair distribution.   Musculoskeletal: Negative for falls, joint pain, joint swelling, muscle cramps, muscle weakness and stiffness.   Gastrointestinal: Negative for constipation, diarrhea, nausea and vomiting.   Neurological: Negative for brief paralysis, disturbances in coordination, focal weakness, numbness, paresthesias, sensory change and tremors.           Objective:      Physical Exam   Constitutional: He is oriented to person, place, and time. He appears well-developed and well-nourished. He is cooperative. No distress.   Cardiovascular:   Pulses:       Popliteal pulses are 2+ on the right side, and 2+ on the left side.        Dorsalis pedis pulses are 2+ on the right side, and 2+ on the left side.        Posterior tibial pulses are 2+ on the right side, and 2+ on the left side.   Capillary refill 3 seconds all toes/distal feet, all toes/both feet warm to touch.      Negative lymphadenopathy bilateral popliteal fossa and tarsal tunnel.      Negavie lower extremity edema bilateral.     Musculoskeletal:        Right ankle: He exhibits normal range of motion, no swelling, no ecchymosis, no deformity, no laceration and normal pulse. Achilles tendon normal. Achilles tendon exhibits no pain, no defect and normal Villavicencio's  test results.   minimal pain to palpation inferior mass in medial band of plantar fascia right and left - non mobile, without ecchymosis, erythema, edema, or cardinal signs infection, and no signs of trauma.    Ankle dorsiflexion decreased at <10 degrees bilateral with moderate increase with knee flexion bilateral.    Otherwise, Normal angle, base, station of gait. All ten toes without clubbing, cyanosis, or signs of ischemia.  No pain to palpation bilateral lower extremities.  Range of motion, stability, muscle strength, and muscle tone normal bilateral feet and legs.     Lymphadenopathy: No inguinal adenopathy noted on the right or left side.   Negative lymphadenopathy bilateral popliteal fossa and tarsal tunnel.    Negative lymphangitic streaking bilateral feet/ankles/legs.   Neurological: He is alert and oriented to person, place, and time. He has normal strength. He displays no atrophy and no tremor. No sensory deficit. He exhibits normal muscle tone. Gait normal.   Reflex Scores:       Patellar reflexes are 2+ on the right side and 2+ on the left side.       Achilles reflexes are 2+ on the right side and 2+ on the left side.  Negative tinel sign to percussion sural, superficial peroneal, deep peroneal, saphenous, and posterior tibial nerves right and left ankles and feet.     Skin: Skin is warm, dry and intact. Capillary refill takes 2 to 3 seconds. No abrasion, no bruising, no burn, no ecchymosis, no laceration, no lesion and no rash noted. He is not diaphoretic. No cyanosis or erythema. No pallor. Nails show no clubbing.     Skin is normal age and health appropriate color, turgor, texture, and temperature bilateral lower extremities without ulceration, hyperpigmentation, discoloration, masses nodules or cords palpated.  No ecchymosis, erythema, edema, or cardinal signs of infection bilateral lower extremities.     Psychiatric: He has a normal mood and affect.             Assessment:       Encounter Diagnoses    Name Primary?    Plantar fibromatosis Yes    Foot pain, bilateral     Equinus contracture of ankle          Plan:       Salvador was seen today for follow-up and foot pain.    Diagnoses and all orders for this visit:    Plantar fibromatosis    Foot pain, bilateral    Equinus contracture of ankle      I counseled the patient on his conditions, their implications and medical management.        Continue stretches, padding, inserts, athletic shoes, nsaid prn.    Declines injection, PT today.            No Follow-up on file.

## 2019-04-03 ENCOUNTER — OFFICE VISIT (OUTPATIENT)
Dept: FAMILY MEDICINE | Facility: CLINIC | Age: 44
End: 2019-04-03
Payer: COMMERCIAL

## 2019-04-03 VITALS
WEIGHT: 190.69 LBS | HEART RATE: 74 BPM | DIASTOLIC BLOOD PRESSURE: 82 MMHG | BODY MASS INDEX: 36.03 KG/M2 | TEMPERATURE: 98 F | SYSTOLIC BLOOD PRESSURE: 128 MMHG | OXYGEN SATURATION: 98 %

## 2019-04-03 DIAGNOSIS — I10 ESSENTIAL HYPERTENSION: Primary | ICD-10-CM

## 2019-04-03 DIAGNOSIS — Z13.220 SCREENING CHOLESTEROL LEVEL: ICD-10-CM

## 2019-04-03 DIAGNOSIS — Z13.1 DIABETES MELLITUS SCREENING: ICD-10-CM

## 2019-04-03 DIAGNOSIS — R01.1 SYSTOLIC MURMUR: ICD-10-CM

## 2019-04-03 DIAGNOSIS — Z13.0 SCREENING FOR DEFICIENCY ANEMIA: ICD-10-CM

## 2019-04-03 DIAGNOSIS — E05.90 SUBCLINICAL HYPERTHYROIDISM: ICD-10-CM

## 2019-04-03 PROCEDURE — 3079F DIAST BP 80-89 MM HG: CPT | Mod: CPTII,S$GLB,, | Performed by: NURSE PRACTITIONER

## 2019-04-03 PROCEDURE — 3008F BODY MASS INDEX DOCD: CPT | Mod: CPTII,S$GLB,, | Performed by: NURSE PRACTITIONER

## 2019-04-03 PROCEDURE — 3074F PR MOST RECENT SYSTOLIC BLOOD PRESSURE < 130 MM HG: ICD-10-PCS | Mod: CPTII,S$GLB,, | Performed by: NURSE PRACTITIONER

## 2019-04-03 PROCEDURE — 3074F SYST BP LT 130 MM HG: CPT | Mod: CPTII,S$GLB,, | Performed by: NURSE PRACTITIONER

## 2019-04-03 PROCEDURE — 99214 PR OFFICE/OUTPT VISIT, EST, LEVL IV, 30-39 MIN: ICD-10-PCS | Mod: S$GLB,,, | Performed by: NURSE PRACTITIONER

## 2019-04-03 PROCEDURE — 99999 PR PBB SHADOW E&M-EST. PATIENT-LVL IV: CPT | Mod: PBBFAC,,, | Performed by: NURSE PRACTITIONER

## 2019-04-03 PROCEDURE — 3008F PR BODY MASS INDEX (BMI) DOCUMENTED: ICD-10-PCS | Mod: CPTII,S$GLB,, | Performed by: NURSE PRACTITIONER

## 2019-04-03 PROCEDURE — 99999 PR PBB SHADOW E&M-EST. PATIENT-LVL IV: ICD-10-PCS | Mod: PBBFAC,,, | Performed by: NURSE PRACTITIONER

## 2019-04-03 PROCEDURE — 3079F PR MOST RECENT DIASTOLIC BLOOD PRESSURE 80-89 MM HG: ICD-10-PCS | Mod: CPTII,S$GLB,, | Performed by: NURSE PRACTITIONER

## 2019-04-03 PROCEDURE — 99214 OFFICE O/P EST MOD 30 MIN: CPT | Mod: S$GLB,,, | Performed by: NURSE PRACTITIONER

## 2019-04-03 RX ORDER — AMLODIPINE BESYLATE 10 MG/1
10 TABLET ORAL DAILY
Qty: 90 TABLET | Refills: 0 | Status: SHIPPED | OUTPATIENT
Start: 2019-04-03 | End: 2019-07-31 | Stop reason: SDUPTHER

## 2019-04-03 NOTE — PROGRESS NOTES
Subjective:       Patient ID: Salvador Pierce is a 44 y.o. male.    Chief Complaint: Follow-up (BP check and medication refill)    Patient is a 44-year-old black male with hypertension, systolic murmur, mild anemia noted on wellness exam June 2018, an an abnormal TSH noted on wellness exam in June 2018 that is here today for 6 month follow up.     Patient has hypertension and currently taking amlodipine 10 mg daily and lisinopril HCT 10/12.5 mg daily.  Blood pressure is controlled on present medications.  /82   Pulse 74   Temp 98 °F (36.7 °C) (Oral)   Wt 86.5 kg (190 lb 11.2 oz)   SpO2 98%   BMI 36.03 kg/m²      Patient has a very mild grade 1/6 systolic murmur noted as a child and had evaluated by cardiologist in the past without any acute abnormalities.     Patient has a mild anemia noted on wellness exam in June 2018 that resolved with blood work in October 2018.     Patient had an abnormal TSH on wellness exam in June 2018.  During exam, the TSH lab was still pending so patient was notified of result over portal in June 2018.  His TSH was mildly low at 0.145 but his free T4 was within normal limits and patient denied any hyperthyroid symptoms at the time.  His thyroid exam was unremarkable during physical exam at wellness. Patient then had thyroid labs done in October 2018 after visit - message was sent over portal:  Your TSH remains low but is improved from labs in June so I will continue to monitor.  We will get thyroid panel in 6 months when you return for blood pressure check.  Please have labs drawn prior to office visit. - Patient had labs scheduled for 3/29/19 but was a NO SHOW for appointment.  Patient will be due for fasting labs in June so will wait and get full wellness labs and thyroid labs with thyroid ultrasound before wellness exam scheduled - advised he MUST get labs PRIOR to visit.      Component      Latest Ref Rng & Units 10/11/2018 6/20/2018   WBC      3.90 - 12.70 K/uL 9.63 7.03    RBC      4.60 - 6.20 M/uL 4.61 4.49 (L)   Hemoglobin      14.0 - 18.0 g/dL 14.4 13.9 (L)   Hematocrit      40.0 - 54.0 % 42.6 41.3   MCV      82 - 98 fL 92 92   MCH      27.0 - 31.0 pg 31.2 (H) 31.0   MCHC      32.0 - 36.0 g/dL 33.8 33.7   RDW      11.5 - 14.5 % 14.0 14.4   Platelets      150 - 350 K/uL 199 169   MPV      9.2 - 12.9 fL 12.4 11.6   Gran # (ANC)      1.8 - 7.7 K/uL 5.5 3.4   Lymph #      1.0 - 4.8 K/uL 2.9 2.8   Mono #      0.3 - 1.0 K/uL 1.0 0.6   Eos #      0.0 - 0.5 K/uL 0.2 0.1   Baso #      0.00 - 0.20 K/uL 0.05 0.04   Gran%      38.0 - 73.0 % 57.2 48.5   Lymph%      18.0 - 48.0 % 29.9 40.0   Mono%      4.0 - 15.0 % 10.8 9.1   Eosinophil%      0.0 - 8.0 % 1.6 1.8   Basophil%      0.0 - 1.9 % 0.5 0.6   Differential Method       Automated Automated   TSH      0.400 - 4.000 uIU/mL 0.213 (L) 0.145 (L)   Free T4      0.71 - 1.51 ng/dL 1.14 1.03     Current Outpatient Medications   Medication Sig Dispense Refill    amLODIPine (NORVASC) 10 MG tablet Take 1 tablet (10 mg total) by mouth once daily. 90 tablet 0    lisinopril-hydrochlorothiazide (PRINZIDE,ZESTORETIC) 10-12.5 mg per tablet Take 1 tablet by mouth once daily. 90 tablet 0    multivitamin capsule Take 1 capsule by mouth once daily.       No current facility-administered medications for this visit.        Past Medical History:   Diagnosis Date    Abnormal TSH 6/20/2018    Patient had a mildly low TSH in June 2018 but normal free T4 and asymtomatic.  Will continue to monitor levels    Hypertension     Mild anemia 6/20/2018    noted on wellness - started on multivitamin    Systolic murmur     reports noted as a child - had evaluated by cardiologist in the past       History reviewed. No pertinent surgical history.    Family History   Problem Relation Age of Onset    Hypertension Mother     Multiple sclerosis Mother     Diabetes Father     Hypertension Father     No Known Problems Brother        Social History     Socioeconomic  History    Marital status:      Spouse name: Not on file    Number of children: Not on file    Years of education: Not on file    Highest education level: Not on file   Occupational History    Occupation: supervisory position   Social Needs    Financial resource strain: Not on file    Food insecurity:     Worry: Not on file     Inability: Not on file    Transportation needs:     Medical: Not on file     Non-medical: Not on file   Tobacco Use    Smoking status: Former Smoker     Packs/day: 1.00     Years: 21.00     Pack years: 21.00     Last attempt to quit: 2018     Years since quittin.1    Smokeless tobacco: Never Used   Substance and Sexual Activity    Alcohol use: Yes     Comment: every other weekend - 3 mixed drinks per occasion    Drug use: No    Sexual activity: Yes     Partners: Female   Lifestyle    Physical activity:     Days per week: Not on file     Minutes per session: Not on file    Stress: Not on file   Relationships    Social connections:     Talks on phone: Not on file     Gets together: Not on file     Attends Judaism service: Not on file     Active member of club or organization: Not on file     Attends meetings of clubs or organizations: Not on file     Relationship status: Not on file   Other Topics Concern    Not on file   Social History Narrative    Not on file       Review of Systems   Constitutional: Negative for activity change, appetite change, fatigue, fever and unexpected weight change.   HENT: Negative for congestion, ear pain, mouth sores, nosebleeds, postnasal drip, rhinorrhea, sinus pressure, sneezing, sore throat, trouble swallowing and voice change.    Eyes: Negative.    Respiratory: Negative for cough, chest tightness and shortness of breath.    Cardiovascular: Negative for chest pain, palpitations and leg swelling.   Gastrointestinal: Negative.  Negative for abdominal pain, blood in stool, constipation, diarrhea, nausea and vomiting.    Endocrine: Negative.    Genitourinary: Negative for difficulty urinating, dysuria, flank pain, hematuria and urgency.   Musculoskeletal: Negative for arthralgias, back pain, gait problem, joint swelling, myalgias and neck pain.   Skin: Negative for color change, rash and wound.   Allergic/Immunologic: Negative for immunocompromised state.   Neurological: Negative for dizziness, tremors, seizures, syncope, speech difficulty and headaches.   Hematological: Negative for adenopathy. Does not bruise/bleed easily.   Psychiatric/Behavioral: Negative for behavioral problems, dysphoric mood, sleep disturbance and suicidal ideas. The patient is not nervous/anxious.          Objective:     Vitals:    04/03/19 1009 04/03/19 1027   BP: 120/88 128/82   Pulse: 74    Temp: 98 °F (36.7 °C)    TempSrc: Oral    SpO2: 98%    Weight: 86.5 kg (190 lb 11.2 oz)           Physical Exam   Constitutional: He is oriented to person, place, and time. He appears well-developed and well-nourished.   HENT:   Head: Normocephalic.   Right Ear: External ear normal.   Left Ear: External ear normal.   Nose: Nose normal.   Mouth/Throat: Oropharynx is clear and moist. No oropharyngeal exudate.   Eyes: Pupils are equal, round, and reactive to light. EOM are normal. Right eye exhibits no discharge. Left eye exhibits no discharge. No scleral icterus.   Neck: Normal range of motion. Neck supple. No JVD present. No tracheal deviation present. No thyroid mass and no thyromegaly present.   Cardiovascular: Normal rate and regular rhythm.   Murmur heard.   Systolic murmur is present with a grade of 1/6.  Grade 1/6 very mild Murmur to right sternal border   Pulmonary/Chest: Effort normal and breath sounds normal. No respiratory distress.   Abdominal: Soft. He exhibits no distension.   Musculoskeletal: Normal range of motion. He exhibits no edema.   Lymphadenopathy:     He has no cervical adenopathy.   Neurological: He is alert and oriented to person, place, and  time. Coordination normal.   Skin: Skin is warm and dry. No rash noted.   Psychiatric: He has a normal mood and affect. His behavior is normal.         Assessment:         ICD-10-CM ICD-9-CM   1. Essential hypertension I10 401.9   2. Systolic murmur R01.1 785.2   3. Subclinical hyperthyroidism E05.90 242.90   4. Screening for deficiency anemia Z13.0 V78.1   5. Screening cholesterol level Z13.220 V77.91   6. Diabetes mellitus screening Z13.1 V77.1       Plan:       Essential hypertension  -  Continue lisinopril HCT and amlodipine at present doses - recheck in 3 months during wellness exam  -     amLODIPine (NORVASC) 10 MG tablet; Take 1 tablet (10 mg total) by mouth once daily.  Dispense: 90 tablet; Refill: 0    Systolic murmur    Subclinical hyperthyroidism  -  Will get full thyroid panel and ultrasound for assessment.  -     US Soft Tissue Head Neck Thyroid; Future; Expected date: 04/03/2019    Screening for deficiency anemia  -     CBC auto differential; Future; Expected date: 06/17/2019    Screening cholesterol level  -     Lipid panel; Future; Expected date: 06/17/2019    Diabetes mellitus screening  -     Comprehensive metabolic panel; Future; Expected date: 06/17/2019      Follow up in about 3 months (around 7/3/2019) for fasting labs and wellness exam.     Patient's Medications   New Prescriptions    No medications on file   Previous Medications    LISINOPRIL-HYDROCHLOROTHIAZIDE (PRINZIDE,ZESTORETIC) 10-12.5 MG PER TABLET    Take 1 tablet by mouth once daily.    MULTIVITAMIN CAPSULE    Take 1 capsule by mouth once daily.   Modified Medications    Modified Medication Previous Medication    AMLODIPINE (NORVASC) 10 MG TABLET amLODIPine (NORVASC) 10 MG tablet       Take 1 tablet (10 mg total) by mouth once daily.    Take 1 tablet (10 mg total) by mouth once daily.   Discontinued Medications    MELOXICAM (MOBIC) 15 MG TABLET    Take 1 tablet (15 mg total) by mouth once daily.

## 2019-05-29 ENCOUNTER — PATIENT OUTREACH (OUTPATIENT)
Dept: OTHER | Facility: OTHER | Age: 44
End: 2019-05-29

## 2019-05-29 NOTE — PROGRESS NOTES
Digital Medicine Enrollment Call    Introduced Mr. Salvador Pierce to Digital Medicine.     Discussed program expectations and requirements.    Introduced digital medicine care team.     Reviewed the importance of self-monitoring for digital medicine participation.     Reviewed that the Digital Medicine team is not available for emergencies and instructed the patient to call 911 or [facility] ([phone] or [phone]) if one arises.          Last 5 Patient Entered Readings                                      Current 30 Day Average: 143/83     Recent Readings 5/25/2019    SBP (mmHg) 143    DBP (mmHg) 83    Pulse 62

## 2019-05-29 NOTE — LETTER
May 30, 2019     Salvador Pierce  [address] [place] [address]       Dear Salvador,    Welcome to [facility]! Our goal is to make care effective, proactive and convenient by using data you send us from home to better treat your chronic conditions.          My name is Sandy Avila, and I am your dedicated Digital Medicine clinician. As an expert in medication management, I will help ensure that the medications you are taking continue to provide the intended benefits and help you reach your goals. You can reach me directly at [phone] or by sending me a message directly through your [facility] account.      I am Vianey Rutherford and I will be your health . My job is to help you identify lifestyle changes to improve your disease control. We will talk about nutrition, exercise, and other ways you may be able to adjust your current habits to better your health. Additionally, we will help ensure you are completing the tests and screenings that are necessary to help manage your conditions. You can reach me directly at [phone] or by sending me a message directly through your [facility] account.    Most importantly, YOU are at the center of this team. Together, we will work to improve your overall health and encourage you to meet your goals for a healthier lifestyle.     What we expect from YOU:  · Please take frequent home blood pressure measurements. We ask that you take at least 1 blood pressure reading per week, but more information will better help us get you know you. Be sure you rest for a few minutes before taking the reading in a quiet, comfortable place.     Be available to receive phone calls or [facility] messages, when appropriate, from your care team. Please let us know if there are any specific days or times that work best for us to reach you via phone.     Complete routine tests and screenings. Dont worry, we will help keep you on track!           What you should expect from  your Digital Medicine Care Team:   We will work with you to create a personalized plan of care and provide you with encouragement and education, including regarding lifestyle changes, that could help you manage your disease states.     We will adjust your current medications, if needed, and continue to monitor your long-term progress.     We will provide you and your physician with monthly progress reports after you have been in the program for more than 30 days.     We will send you reminders through [facility] and text messages to help ensure you do not miss any testing deadlines to help manage your disease states.    You will be able to reach us by phone or through your [facility] account by clicking our names under Care Team on the right side of the home screen.    I look forward to working with you to achieve your blood pressure goals!    We look forward to working with you to help manage your health,    Sincerely,    Your Digital Medicine Team    Please visit our websites to learn more:   · Hypertension: [email or web address]      Remember, we are not available for emergencies. If you have an emergency, please contact your doctors office directly or call [facility] ([phone] or [phone]) or [phone].

## 2019-05-31 ENCOUNTER — PATIENT OUTREACH (OUTPATIENT)
Dept: OTHER | Facility: OTHER | Age: 44
End: 2019-05-31

## 2019-05-31 NOTE — PROGRESS NOTES
"Last 5 Patient Entered Readings                                      Current 30 Day Average: 143/83     Recent Readings 5/25/2019    SBP (mmHg) 143    DBP (mmHg) 83    Pulse 62        Digital Medicine: Health  Introduction    Introduced Mr. Salvador Pierce to Digital Medicine. Discussed health  role and recommended lifestyle modifications.    Lifestyle Assessment:  Current Dietary Habits(i.e. low sodium, food labels, dining out): deferred  Exercise: Patient has an active job and is outside a lot.  Alcohol/Tobacco: Unknown  Medication Adherence: has been compliant with the medicaiton regimen    Reviewed the importance of self-monitoring, medication adherence, and that the health  can be used as a resource for lifestyle modifications to help reduce or maintain a healthy lifestyle.  Reviewed that the Digital Medicine team is not available for emergencies and instructed the patient to call 911 or [facility] ([phone] or [phone]) if one arises.    Patient only has one reading right now. He said his wife is a nurse and she will be taking his blood pressure. She keeps the cesar on her phone. He tries to relax while taking BP. He takes medications in the morning and will try to take a few different BP readings throughout the day. Patient works outside. A few weeks ago he suffered "heat exhaustion" and he saw his "diastolic number was in the 90s". After a cold shower he felt better.     "

## 2019-06-14 ENCOUNTER — PATIENT OUTREACH (OUTPATIENT)
Dept: OTHER | Facility: OTHER | Age: 44
End: 2019-06-14

## 2019-06-14 NOTE — PROGRESS NOTES
Last 5 Patient Entered Readings                                      Current 30 Day Average: 136/85     Recent Readings 6/13/2019 6/5/2019 5/25/2019    SBP (mmHg) 126 139 143    DBP (mmHg) 82 89 83    Pulse 86 65 62          Digital Medicine: Health  Follow Up    Left voicemail to follow up with Leonor Salvador Stan.  Current BP average 136/85 mmHg is not at goal, 130/80.

## 2019-06-21 NOTE — PROGRESS NOTES
Last 5 Patient Entered Readings                                      Current 30 Day Average: 136/85     Recent Readings 6/13/2019 6/5/2019 5/25/2019    SBP (mmHg) 126 139 143    DBP (mmHg) 82 89 83    Pulse 86 65 62          Patient was not available to talk. He said he would give me a call back later.

## 2019-06-28 NOTE — PROGRESS NOTES
"Last 5 Patient Entered Readings                                      Current 30 Day Average: 133/86     Recent Readings 6/13/2019 6/5/2019 5/25/2019    SBP (mmHg) 126 139 143    DBP (mmHg) 82 89 83    Pulse 86 65 62        Digital Medicine: Health  Follow Up    Lifestyle Modifications:    1.Dietary Modifications (Sodium intake <2,000mg/day, food labels, dining out): Patient reports that he has been trying to "eliminate fried foods" in his life. He's been cooking more at home and grilling more. He's been using salt free seasoning to cook. He reports that they only eat out about 1-2x/week. He'll usually eat at [employer], a steak house, or [employer]. He reports that he tries to make good choices there. We talked about portion sizes and asking for sauces on the sides. Patient reports that during the day he eats light and will have gatorade G2, P3 protein pack, or granola bars. We talked about adding in more water during the day to help dehydration.    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    3.Medication Therapy: Patient has been compliant with the medication regimen.    4.Patient has the following medication side effects/concerns: none  (Frequency/Alleviating factors/Precipitating factors, etc.)     Follow up with Mr. Salvador Pierce completed. Mr. Pierce reports doing well. He reports that he was in jury duty the last week and unable to submit readings. Requested about 2-3 readings a week (only have 3 readings right now). No further questions or concerns. Will continue to follow up to achieve health goals.      "

## 2019-07-26 ENCOUNTER — PATIENT OUTREACH (OUTPATIENT)
Dept: OTHER | Facility: OTHER | Age: 44
End: 2019-07-26
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
## 2019-07-31 ENCOUNTER — OFFICE VISIT (OUTPATIENT)
Dept: FAMILY MEDICINE | Facility: CLINIC | Age: 44
End: 2019-07-31
Payer: COMMERCIAL

## 2019-07-31 VITALS
WEIGHT: 179.44 LBS | BODY MASS INDEX: 25.12 KG/M2 | TEMPERATURE: 98 F | HEIGHT: 71 IN | DIASTOLIC BLOOD PRESSURE: 82 MMHG | HEART RATE: 67 BPM | SYSTOLIC BLOOD PRESSURE: 124 MMHG | RESPIRATION RATE: 20 BRPM | OXYGEN SATURATION: 99 %

## 2019-07-31 DIAGNOSIS — I10 ESSENTIAL HYPERTENSION: ICD-10-CM

## 2019-07-31 DIAGNOSIS — Z00.00 ANNUAL PHYSICAL EXAM: Primary | ICD-10-CM

## 2019-07-31 DIAGNOSIS — D64.9 NORMOCHROMIC NORMOCYTIC ANEMIA: ICD-10-CM

## 2019-07-31 DIAGNOSIS — R01.1 SYSTOLIC MURMUR: ICD-10-CM

## 2019-07-31 DIAGNOSIS — E05.90 SUBCLINICAL HYPERTHYROIDISM: ICD-10-CM

## 2019-07-31 PROCEDURE — 99396 PR PREVENTIVE VISIT,EST,40-64: ICD-10-PCS | Mod: S$GLB,,, | Performed by: NURSE PRACTITIONER

## 2019-07-31 PROCEDURE — 99999 PR PBB SHADOW E&M-EST. PATIENT-LVL IV: ICD-10-PCS | Mod: PBBFAC,,, | Performed by: NURSE PRACTITIONER

## 2019-07-31 PROCEDURE — 3074F PR MOST RECENT SYSTOLIC BLOOD PRESSURE < 130 MM HG: ICD-10-PCS | Mod: CPTII,S$GLB,, | Performed by: NURSE PRACTITIONER

## 2019-07-31 PROCEDURE — 3079F DIAST BP 80-89 MM HG: CPT | Mod: CPTII,S$GLB,, | Performed by: NURSE PRACTITIONER

## 2019-07-31 PROCEDURE — 3074F SYST BP LT 130 MM HG: CPT | Mod: CPTII,S$GLB,, | Performed by: NURSE PRACTITIONER

## 2019-07-31 PROCEDURE — 99396 PREV VISIT EST AGE 40-64: CPT | Mod: S$GLB,,, | Performed by: NURSE PRACTITIONER

## 2019-07-31 PROCEDURE — 3079F PR MOST RECENT DIASTOLIC BLOOD PRESSURE 80-89 MM HG: ICD-10-PCS | Mod: CPTII,S$GLB,, | Performed by: NURSE PRACTITIONER

## 2019-07-31 PROCEDURE — 99999 PR PBB SHADOW E&M-EST. PATIENT-LVL IV: CPT | Mod: PBBFAC,,, | Performed by: NURSE PRACTITIONER

## 2019-07-31 RX ORDER — AMLODIPINE BESYLATE 10 MG/1
10 TABLET ORAL DAILY
Qty: 90 TABLET | Refills: 1 | Status: SHIPPED | OUTPATIENT
Start: 2019-07-31 | End: 2019-09-10 | Stop reason: SDUPTHER

## 2019-07-31 RX ORDER — LISINOPRIL AND HYDROCHLOROTHIAZIDE 10; 12.5 MG/1; MG/1
1 TABLET ORAL DAILY
Qty: 90 TABLET | Refills: 1 | Status: SHIPPED | OUTPATIENT
Start: 2019-07-31 | End: 2019-09-10 | Stop reason: SDUPTHER

## 2019-08-28 ENCOUNTER — PATIENT OUTREACH (OUTPATIENT)
Dept: OTHER | Facility: OTHER | Age: 44
End: 2019-08-28

## 2019-08-28 NOTE — PROGRESS NOTES
Last 5 Patient Entered Readings                                      Current 30 Day Average: 138/81     Recent Readings 8/22/2019 7/30/2019 7/2/2019 6/13/2019 6/5/2019    SBP (mmHg) 123 153 134 126 139    DBP (mmHg) 74 88 83 82 89    Pulse 81 74 70 86 65            Digital Medicine: Health  Follow Up    Called to follow up with  Salvador Stan. Unable to leave voicemail  Current BP average 138/81 mmHg is not at goal, 130/80

## 2019-08-28 NOTE — LETTER
December 16, 2019     Salvador Pierce  [address] [place] [address]       Dear Salvador,    We have made several attempts to encourage your participation in [facility] Program. Unfortunately, we have been unsuccessful.     This is an official notice that you are no longer enrolled in the digital medicine program, and thus, we will no longer be managing your disease states. Please note this has no impact on your relationship with [facility] or your providers. Going forward, please reach out to your primary care provider with any questions or concerns regarding your health.    Please contact [phone] if you have any additional questions.    Sincerely,  The [facility] Team

## 2019-08-28 NOTE — LETTER
November 4, 2019     Salvador Pierce  [address] [place] [address]       Dear Salvador,    Thank you for enrolling in [facility]. To participate, we ask that you submit information at least once weekly through your [facility] account and maintain regular contact with your Care Team. We have not received any data or heard from you in some time.     The Digital Medicine Care Team has attempted to reach you on multiple occasions to determine if you would like to continue participating in the program. While we encourage you to continue participating fully, we understand that circumstances may change.     To continue participating in the program, please contact me at [phone]. If we do not hear back, you will be un-enrolled, and your physician will be notified of your decision.    If you have submitted data and believe you are receiving this letter in error, please call the Digital Medicine Patient Support Line at [phone] for troubleshooting.      We look forward to hearing from you soon.    Sincerely,     Vianey Ferrise  [facility]

## 2019-09-04 NOTE — PROGRESS NOTES
Last 5 Patient Entered Readings                                      Current 30 Day Average: 124/75     Recent Readings 8/28/2019 8/22/2019 7/30/2019 7/2/2019 6/13/2019    SBP (mmHg) 124 123 153 134 126    DBP (mmHg) 76 74 88 83 82    Pulse 80 81 74 70 86          Digital Medicine: Health  Follow Up    Left voicemail to follow up with Leonor Salvador Stan.  Current BP average 124/75 mmHg is not at goal, 130/80

## 2019-09-09 ENCOUNTER — PATIENT MESSAGE (OUTPATIENT)
Dept: FAMILY MEDICINE | Facility: CLINIC | Age: 44
End: 2019-09-09

## 2019-09-09 DIAGNOSIS — I10 ESSENTIAL HYPERTENSION: ICD-10-CM

## 2019-09-11 RX ORDER — AMLODIPINE BESYLATE 10 MG/1
10 TABLET ORAL DAILY
Qty: 90 TABLET | Refills: 1 | Status: SHIPPED | OUTPATIENT
Start: 2019-09-11 | End: 2020-01-20 | Stop reason: SDUPTHER

## 2019-09-11 RX ORDER — LISINOPRIL AND HYDROCHLOROTHIAZIDE 10; 12.5 MG/1; MG/1
1 TABLET ORAL DAILY
Qty: 90 TABLET | Refills: 1 | Status: SHIPPED | OUTPATIENT
Start: 2019-09-11 | End: 2020-02-24 | Stop reason: SDUPTHER

## 2019-09-27 ENCOUNTER — PATIENT MESSAGE (OUTPATIENT)
Dept: ADMINISTRATIVE | Facility: OTHER | Age: 44
End: 2019-09-27

## 2020-01-20 DIAGNOSIS — I10 ESSENTIAL HYPERTENSION: ICD-10-CM

## 2020-01-20 RX ORDER — AMLODIPINE BESYLATE 10 MG/1
10 TABLET ORAL DAILY
Qty: 90 TABLET | Refills: 1 | Status: SHIPPED | OUTPATIENT
Start: 2020-01-20 | End: 2020-02-24 | Stop reason: SDUPTHER

## 2020-01-22 ENCOUNTER — PATIENT MESSAGE (OUTPATIENT)
Dept: FAMILY MEDICINE | Facility: CLINIC | Age: 45
End: 2020-01-22

## 2020-02-24 ENCOUNTER — OFFICE VISIT (OUTPATIENT)
Dept: FAMILY MEDICINE | Facility: CLINIC | Age: 45
End: 2020-02-24
Payer: COMMERCIAL

## 2020-02-24 VITALS
BODY MASS INDEX: 27.27 KG/M2 | RESPIRATION RATE: 16 BRPM | DIASTOLIC BLOOD PRESSURE: 92 MMHG | HEART RATE: 74 BPM | OXYGEN SATURATION: 98 % | HEIGHT: 71 IN | SYSTOLIC BLOOD PRESSURE: 140 MMHG | WEIGHT: 194.75 LBS | TEMPERATURE: 98 F

## 2020-02-24 DIAGNOSIS — D64.9 MILD ANEMIA: ICD-10-CM

## 2020-02-24 DIAGNOSIS — I10 ESSENTIAL HYPERTENSION: Primary | ICD-10-CM

## 2020-02-24 DIAGNOSIS — R01.1 SYSTOLIC MURMUR: ICD-10-CM

## 2020-02-24 DIAGNOSIS — E05.90 SUBCLINICAL HYPERTHYROIDISM: ICD-10-CM

## 2020-02-24 PROCEDURE — 99999 PR PBB SHADOW E&M-EST. PATIENT-LVL IV: ICD-10-PCS | Mod: PBBFAC,,, | Performed by: NURSE PRACTITIONER

## 2020-02-24 PROCEDURE — 99214 PR OFFICE/OUTPT VISIT, EST, LEVL IV, 30-39 MIN: ICD-10-PCS | Mod: S$GLB,,, | Performed by: NURSE PRACTITIONER

## 2020-02-24 PROCEDURE — 3008F BODY MASS INDEX DOCD: CPT | Mod: CPTII,S$GLB,, | Performed by: NURSE PRACTITIONER

## 2020-02-24 PROCEDURE — 3074F PR MOST RECENT SYSTOLIC BLOOD PRESSURE < 130 MM HG: ICD-10-PCS | Mod: CPTII,S$GLB,, | Performed by: NURSE PRACTITIONER

## 2020-02-24 PROCEDURE — 3074F SYST BP LT 130 MM HG: CPT | Mod: CPTII,S$GLB,, | Performed by: NURSE PRACTITIONER

## 2020-02-24 PROCEDURE — 3080F DIAST BP >= 90 MM HG: CPT | Mod: CPTII,S$GLB,, | Performed by: NURSE PRACTITIONER

## 2020-02-24 PROCEDURE — 99214 OFFICE O/P EST MOD 30 MIN: CPT | Mod: S$GLB,,, | Performed by: NURSE PRACTITIONER

## 2020-02-24 PROCEDURE — 3080F PR MOST RECENT DIASTOLIC BLOOD PRESSURE >= 90 MM HG: ICD-10-PCS | Mod: CPTII,S$GLB,, | Performed by: NURSE PRACTITIONER

## 2020-02-24 PROCEDURE — 99999 PR PBB SHADOW E&M-EST. PATIENT-LVL IV: CPT | Mod: PBBFAC,,, | Performed by: NURSE PRACTITIONER

## 2020-02-24 PROCEDURE — 3008F PR BODY MASS INDEX (BMI) DOCUMENTED: ICD-10-PCS | Mod: CPTII,S$GLB,, | Performed by: NURSE PRACTITIONER

## 2020-02-24 RX ORDER — LISINOPRIL AND HYDROCHLOROTHIAZIDE 10; 12.5 MG/1; MG/1
1 TABLET ORAL DAILY
Qty: 30 TABLET | Refills: 0 | Status: SHIPPED | OUTPATIENT
Start: 2020-02-24 | End: 2020-03-24 | Stop reason: SDUPTHER

## 2020-02-24 RX ORDER — AMLODIPINE BESYLATE 10 MG/1
10 TABLET ORAL DAILY
Qty: 30 TABLET | Refills: 0 | Status: SHIPPED | OUTPATIENT
Start: 2020-02-24 | End: 2020-03-24 | Stop reason: SDUPTHER

## 2020-02-24 NOTE — PROGRESS NOTES
"Subjective:       Patient ID: Salvador Pierce is a 45 y.o. male.    Chief Complaint: Follow-up    Patient is a 45-year-old black male with hypertension, systolic murmur, mild anemia noted on wellness exam June 2018, an an abnormal TSH/subclinical hypothyroidism noted on wellness exam in June 2018 that is here today for follow up.  Patient did not have labs done to assess for the anemia workup so will send for fasting labs today and review with patient at follow up in 4 weeks.     Patient has hypertension  That is SUPPOSED to be taking amlodipine 10 mg daily and lisinopril HCT 10/12.5 mg daily.  He reports he was out of Amlodipine 10 mg daily for about 1 month because he lost insurance and would have cost $160 dollars.  But then he regained insurance and go back on the Amlodipine 10 mg daily but started feeling weird on both medications so he chose to stop the LISINOPRIL HCT medication on his own.  Patient did not contact me at either time.  Advised patient to NEVER stop medication without consulting with me - I could have gotten his Amlodipine medication for $9 CASH PAY without insurance if he would have reached out to me and explained to patient that the Lisinopril HCT medication is NECESSARY for blood pressure control and the reason he felt weird is because he had gotten off of the Amlodipine but then started it back at the highest dose and did not titrate up which is what gave him "feeling weird" side effects. Advised patient he should continue the Amlodipine 10 mg daily and get back on the Lisinopril HCT 10/12.5 mg -start off with 1/2 tablet daily for couple days and then increase to whole tablet daily.  We will recheck blood pressure in 4 weeks.     Patient has a very mild grade 1/6 systolic murmur noted as a child and had evaluated by cardiologist in the past without any acute abnormalities.     Patient has a mild anemia noted on wellness exam in June 2018 that resolved with blood work in October 2018.  The mild " normocytic anemia was again present on July 2019 Wellness labs.  Reported no blood in stool and no dark stools so planned to recheck in 6 months which is now but patient did not have blood work drawn so will send for labs today.  Will review results when patient returns in 4 weeks.     Patient had an abnormal TSH on wellness labs in June 2018.  His TSH was mildly low at 0.145 but his free T4 was within normal limits and patient denied any hyperthyroid symptoms at the time.  His thyroid exam was unremarkable during physical exam at wellness. Patient then had thyroid labs done in October 2018 and TSH remained low but  improved from labs in June 2018 so plan was to continue to monitor.  Patient had labs scheduled for 3/29/19 but was a NO SHOW for appointment. Patient then seen in July 2019 and had full thyroid panel and thyroid ultrasound for evaluation.  TSH remained low at 0.116 but rest of T3 and T4 labs were okay.  His thyroid antibodies were also negative and his thyroid ultrasound showed no significant abnormality so no further workup is needed unless he becomes symptomatic.  Will monitor thyroid level yearly now.           Current Outpatient Medications   Medication Sig Dispense Refill    amLODIPine (NORVASC) 10 MG tablet Take 1 tablet (10 mg total) by mouth once daily. 30 tablet 0    lisinopril-hydrochlorothiazide (PRINZIDE,ZESTORETIC) 10-12.5 mg per tablet Take 1 tablet by mouth once daily. 30 tablet 0    multivitamin capsule Take 1 capsule by mouth once daily.       No current facility-administered medications for this visit.        Past Medical History:   Diagnosis Date    Abnormal TSH 6/20/2018    Patient had a mildly low TSH in June 2018 but normal free T4 and asymtomatic.  Will continue to monitor levels    Hypertension     Mild anemia 6/20/2018    noted on wellness - started on multivitamin    Subclinical hyperthyroidism 10/19/2018    TSH remains low at 0.116 but rest of T3 and T4 labs are okay.   His thyroid antibodies are also negative and his thyroid ultrasound showed no significant abnormality so no further workup is needed unless he becomes symptomatic.  Will monitor thyroid level yearly now.     Systolic murmur     reports noted as a child - had evaluated by cardiologist in the past       Past Surgical History:   Procedure Laterality Date    HERNIA REPAIR      right inguinal hernia at age 12       Family History   Problem Relation Age of Onset    Hypertension Mother     Multiple sclerosis Mother     Diabetes Father     Hypertension Father     No Known Problems Brother        Social History     Socioeconomic History    Marital status:      Spouse name: Not on file    Number of children: Not on file    Years of education: Not on file    Highest education level: Not on file   Occupational History    Occupation: supervisory position   Social Needs    Financial resource strain: Somewhat hard    Food insecurity:     Worry: Never true     Inability: Never true    Transportation needs:     Medical: No     Non-medical: No   Tobacco Use    Smoking status: Former Smoker     Packs/day: 1.00     Years: 21.00     Pack years: 21.00     Types: Cigars     Last attempt to quit: 2018     Years since quittin.0    Smokeless tobacco: Never Used   Substance and Sexual Activity    Alcohol use: Yes     Frequency: 2-4 times a month     Drinks per session: 3 or 4     Binge frequency: Less than monthly     Comment: every other weekend - 3 mixed drinks per occasion    Drug use: No    Sexual activity: Yes     Partners: Female   Lifestyle    Physical activity:     Days per week: 1 day     Minutes per session: 30 min    Stress: Not at all   Relationships    Social connections:     Talks on phone: More than three times a week     Gets together: Twice a week     Attends Nondenominational service: Not on file     Active member of club or organization: Yes     Attends meetings of clubs or organizations: More  "than 4 times per year     Relationship status:    Other Topics Concern    Not on file   Social History Narrative    Not on file       Review of Systems   Constitutional: Negative for activity change, appetite change, fatigue, fever and unexpected weight change.   HENT: Negative for congestion, ear pain, mouth sores, nosebleeds, postnasal drip, rhinorrhea, sinus pressure, sneezing, sore throat, trouble swallowing and voice change.    Eyes: Negative.    Respiratory: Negative for cough, chest tightness and shortness of breath.    Cardiovascular: Negative for chest pain, palpitations and leg swelling.   Gastrointestinal: Negative.  Negative for abdominal pain, blood in stool, constipation, diarrhea, nausea and vomiting.   Endocrine: Negative.    Genitourinary: Negative for difficulty urinating, dysuria, flank pain, hematuria and urgency.   Musculoskeletal: Negative for arthralgias, back pain, gait problem, joint swelling, myalgias and neck pain.   Skin: Negative for color change, rash and wound.   Allergic/Immunologic: Negative for immunocompromised state.   Neurological: Negative for dizziness, tremors, seizures, syncope, speech difficulty and headaches.   Hematological: Negative for adenopathy. Does not bruise/bleed easily.   Psychiatric/Behavioral: Negative for behavioral problems, dysphoric mood, sleep disturbance and suicidal ideas. The patient is not nervous/anxious.          Objective:     Vitals:    02/24/20 0713 02/24/20 0727   BP: (!) 138/92 (!) 140/92   BP Location: Right arm Left arm   Patient Position: Sitting    BP Method: Large (Manual)    Pulse: 74    Resp: 16    Temp: 98.1 °F (36.7 °C)    TempSrc: Oral    SpO2: 98%    Weight: 88.3 kg (194 lb 12.4 oz)    Height: 5' 11" (1.803 m)           Physical Exam   Constitutional: He is oriented to person, place, and time. He appears well-developed and well-nourished.   Body mass index is 27.17 kg/m².   HENT:   Head: Normocephalic.   Right Ear: External " ear normal.   Left Ear: External ear normal.   Nose: Nose normal.   Mouth/Throat: Oropharynx is clear and moist. No oropharyngeal exudate.   Eyes: Pupils are equal, round, and reactive to light. EOM are normal. Right eye exhibits no discharge. Left eye exhibits no discharge. No scleral icterus.   Neck: Normal range of motion. Neck supple. No JVD present. No tracheal deviation present. No thyroid mass and no thyromegaly present.   Cardiovascular: Normal rate and regular rhythm.   Murmur heard.   Systolic murmur is present with a grade of 1/6.  Grade 1/6 very mild Murmur to right sternal border   Pulmonary/Chest: Effort normal and breath sounds normal. No respiratory distress.   Abdominal: Soft. He exhibits no distension.   Musculoskeletal: Normal range of motion. He exhibits no edema.   Lymphadenopathy:     He has no cervical adenopathy.   Neurological: He is alert and oriented to person, place, and time. Coordination normal.   Skin: Skin is warm and dry. No rash noted.   Psychiatric: He has a normal mood and affect. His behavior is normal.         Assessment:         ICD-10-CM ICD-9-CM   1. Essential hypertension I10 401.9   2. Systolic murmur R01.1 785.2   3. Mild anemia D64.9 285.9   4. Subclinical hyperthyroidism E05.90 242.90       Plan:       Essential hypertension  -  Get back on the Lisinopril HCT starting at 1/2 tablet for 4 days and then increasing to whole tablet daily.  Continue the Amlodipine 10 mg daily and recheck in 4 weeks.  -     lisinopril-hydrochlorothiazide (PRINZIDE,ZESTORETIC) 10-12.5 mg per tablet; Take 1 tablet by mouth once daily.  Dispense: 30 tablet; Refill: 0  -     amLODIPine (NORVASC) 10 MG tablet; Take 1 tablet (10 mg total) by mouth once daily.  Dispense: 30 tablet; Refill: 0    Systolic murmur  -  Minimal and unchanged.    Mild anemia  -  Will get labs today to recheck and will review at next visit.    Subclinical hyperthyroidism  -  Negative thyroid antibodies and negative  ultrasound.  Asymptomatic so monitoring yearly which will be at next wellness exam this year.      Follow up in about 4 weeks (around 3/23/2020) for BP  check.     Patient's Medications   New Prescriptions    No medications on file   Previous Medications    MULTIVITAMIN CAPSULE    Take 1 capsule by mouth once daily.   Modified Medications    Modified Medication Previous Medication    AMLODIPINE (NORVASC) 10 MG TABLET amLODIPine (NORVASC) 10 MG tablet       Take 1 tablet (10 mg total) by mouth once daily.    Take 1 tablet (10 mg total) by mouth once daily.    LISINOPRIL-HYDROCHLOROTHIAZIDE (PRINZIDE,ZESTORETIC) 10-12.5 MG PER TABLET lisinopril-hydrochlorothiazide (PRINZIDE,ZESTORETIC) 10-12.5 mg per tablet       Take 1 tablet by mouth once daily.    Take 1 tablet by mouth once daily.   Discontinued Medications    No medications on file

## 2020-02-24 NOTE — PATIENT INSTRUCTIONS
Controlling High Blood Pressure  High blood pressure (hypertension) is often called the silent killer. This is because many people who have it dont know it. High blood pressure is defined as 140/90 mm Hg or higher. Know your blood pressure and remember to check it regularly. Doing so can save your life. Here are some things you can do to help control your blood pressure.    Choose heart-healthy foods  · Select low-salt, low-fat foods. Limit sodium intake to 2,400 mg per day or the amount suggested by your healthcare provider.  · Limit canned, dried, cured, packaged, and fast foods. These can contain a lot of salt.  · Eat 8 to 10 servings of fruits and vegetables every day.  · Choose lean meats, fish, or chicken.  · Eat whole-grain pasta, brown rice, and beans.  · Eat 2 to 3 servings of low-fat or fat-free dairy products.  · Ask your doctor about the DASH eating plan. This plan helps reduce blood pressure.  · When you go to a restaurant, ask that your meal be prepared with no added salt.  Maintain a healthy weight  · Ask your healthcare provider how many calories to eat a day. Then stick to that number.  · Ask your healthcare provider what weight range is healthiest for you. If you are overweight, a weight loss of only 3% to 5% of your body weight can help lower blood pressure. Generally, a good weight loss goal is to lose 10% of your body weight in a year.  · Limit snacks and sweets.  · Get regular exercise.  Get up and get active  · Choose activities you enjoy. Find ones you can do with friends or family. This includes bicycling, dancing, walking, and jogging.  · Park farther away from building entrances.  · Use stairs instead of the elevator.  · When you can, walk or bike instead of driving.  · [place] leaves, garden, or do household repairs.  · Be active at a moderate to vigorous level of physical activity for at least 40 minutes for a minimum of 3 to 4 days a week.   Manage stress  · Make time to relax and enjoy  life. Find time to laugh.  · Communicate your concerns with your loved ones and your healthcare provider.  · Visit with family and friends, and keep up with hobbies.  Limit alcohol and quit smoking  · Men should have no more than 2 drinks per day.  · Women should have no more than 1 drink per day.  · Talk with your healthcare provider about quitting smoking. Smoking significantly increases your risk for heart disease and stroke. Ask your healthcare provider about community smoking cessation programs and other options.  Medicines  If lifestyle changes arent enough, your healthcare provider may prescribe high blood pressure medicine. Take all medicines as prescribed. If you have any questions about your medicines, ask your healthcare provider before stopping or changing them.   Date Last Reviewed: 4/27/2016  © 0182-0577 [employer]. [address] [place] [address], [place] [address]. All rights reserved. This information is not intended as a substitute for professional medical care. Always follow your healthcare professional's instructions.

## 2020-03-23 ENCOUNTER — PATIENT MESSAGE (OUTPATIENT)
Dept: FAMILY MEDICINE | Facility: CLINIC | Age: 45
End: 2020-03-23

## 2020-03-24 DIAGNOSIS — I10 ESSENTIAL HYPERTENSION: ICD-10-CM

## 2020-03-24 RX ORDER — LISINOPRIL AND HYDROCHLOROTHIAZIDE 10; 12.5 MG/1; MG/1
1 TABLET ORAL DAILY
Qty: 30 TABLET | Refills: 0 | Status: SHIPPED | OUTPATIENT
Start: 2020-03-24 | End: 2020-07-21 | Stop reason: SDUPTHER

## 2020-03-24 RX ORDER — AMLODIPINE BESYLATE 10 MG/1
10 TABLET ORAL DAILY
Qty: 30 TABLET | Refills: 0 | Status: SHIPPED | OUTPATIENT
Start: 2020-03-24 | End: 2020-07-21 | Stop reason: SDUPTHER

## 2020-03-24 NOTE — TELEPHONE ENCOUNTER
----- Message from Shahla Villavicencio sent at 3/24/2020  8:43 AM CDT -----  Contact: PT    Pt is requesting a refill on amLODIPine (NORVASC) 10 MG tablet     Thanks

## 2020-07-15 ENCOUNTER — OFFICE VISIT (OUTPATIENT)
Dept: FAMILY MEDICINE | Facility: CLINIC | Age: 45
End: 2020-07-15
Payer: COMMERCIAL

## 2020-07-15 VITALS
TEMPERATURE: 98 F | HEART RATE: 77 BPM | HEIGHT: 71 IN | BODY MASS INDEX: 27.18 KG/M2 | WEIGHT: 194.13 LBS | SYSTOLIC BLOOD PRESSURE: 118 MMHG | OXYGEN SATURATION: 98 % | DIASTOLIC BLOOD PRESSURE: 76 MMHG

## 2020-07-15 DIAGNOSIS — I10 ESSENTIAL HYPERTENSION: Primary | ICD-10-CM

## 2020-07-15 DIAGNOSIS — Z00.00 ENCOUNTER FOR BLOOD TEST FOR ROUTINE GENERAL PHYSICAL EXAMINATION: ICD-10-CM

## 2020-07-15 DIAGNOSIS — Z13.220 SCREENING CHOLESTEROL LEVEL: ICD-10-CM

## 2020-07-15 DIAGNOSIS — Z11.4 SCREENING FOR HIV WITHOUT PRESENCE OF RISK FACTORS: ICD-10-CM

## 2020-07-15 DIAGNOSIS — Z13.1 DIABETES MELLITUS SCREENING: ICD-10-CM

## 2020-07-15 DIAGNOSIS — R79.0 ABNORMAL IRON SATURATION: ICD-10-CM

## 2020-07-15 DIAGNOSIS — R01.1 SYSTOLIC MURMUR: ICD-10-CM

## 2020-07-15 DIAGNOSIS — D64.9 MILD ANEMIA: ICD-10-CM

## 2020-07-15 DIAGNOSIS — E05.90 SUBCLINICAL HYPERTHYROIDISM: ICD-10-CM

## 2020-07-15 PROCEDURE — 3008F BODY MASS INDEX DOCD: CPT | Mod: CPTII,S$GLB,, | Performed by: NURSE PRACTITIONER

## 2020-07-15 PROCEDURE — 99999 PR PBB SHADOW E&M-EST. PATIENT-LVL IV: CPT | Mod: PBBFAC,,, | Performed by: NURSE PRACTITIONER

## 2020-07-15 PROCEDURE — 99214 OFFICE O/P EST MOD 30 MIN: CPT | Mod: S$GLB,,, | Performed by: NURSE PRACTITIONER

## 2020-07-15 PROCEDURE — 99214 PR OFFICE/OUTPT VISIT, EST, LEVL IV, 30-39 MIN: ICD-10-PCS | Mod: S$GLB,,, | Performed by: NURSE PRACTITIONER

## 2020-07-15 PROCEDURE — 99999 PR PBB SHADOW E&M-EST. PATIENT-LVL IV: ICD-10-PCS | Mod: PBBFAC,,, | Performed by: NURSE PRACTITIONER

## 2020-07-15 PROCEDURE — 3074F SYST BP LT 130 MM HG: CPT | Mod: CPTII,S$GLB,, | Performed by: NURSE PRACTITIONER

## 2020-07-15 PROCEDURE — 3078F DIAST BP <80 MM HG: CPT | Mod: CPTII,S$GLB,, | Performed by: NURSE PRACTITIONER

## 2020-07-15 PROCEDURE — 3078F PR MOST RECENT DIASTOLIC BLOOD PRESSURE < 80 MM HG: ICD-10-PCS | Mod: CPTII,S$GLB,, | Performed by: NURSE PRACTITIONER

## 2020-07-15 PROCEDURE — 3074F PR MOST RECENT SYSTOLIC BLOOD PRESSURE < 130 MM HG: ICD-10-PCS | Mod: CPTII,S$GLB,, | Performed by: NURSE PRACTITIONER

## 2020-07-15 PROCEDURE — 3008F PR BODY MASS INDEX (BMI) DOCUMENTED: ICD-10-PCS | Mod: CPTII,S$GLB,, | Performed by: NURSE PRACTITIONER

## 2020-07-15 NOTE — PROGRESS NOTES
"Subjective:       Patient ID: Salvador Pierce is a 45 y.o. male.    Chief Complaint: Hypertension (follow up)    Patient is a 45-year-old black male with hypertension, systolic murmur, mild anemia noted on wellness exam June 2018, an an abnormal TSH/subclinical hyperthyroidism noted on wellness exam in June 2018 that had normal thyroid panel that is here today for medication follow up.    Patient has Hypertension that is controlled on lisinopril HCT 10/12.5 mg daily and amlodipine 10 mg daily.  /76   Pulse 77   Temp 98 °F (36.7 °C) (Oral)   Ht 5' 11" (1.803 m)   Wt 88 kg (194 lb 1.8 oz)   SpO2 98%   BMI 27.07 kg/m²     Patient has a very mild grade 1/6 systolic murmur noted as a child and had evaluated by cardiologist in the past without any acute abnormalities.    Patient has a mild anemia noted on wellness exam in June 2018 that comes and goes with CBC monitoring.  On anemia workup, patient has a low iron saturation of 14% but ferritin level was normal and vitamin B12 and folate levels normal.  Will now monitor yearly starting with next wellness exam at end of year unless significant changes noted.    Patient had an abnormal TSH on wellness labs in June 2018.  His TSH was mildly low at 0.145 but his free T4 was within normal limits and patient denied any hyperthyroid symptoms at the time. His thyroid exam was unremarkable during physical exam.  Patient then seen in July 2019 and had full thyroid panel and thyroid ultrasound for evaluation.  TSH remained low at 0.116 but rest of T3 and T4 labs were okay.  His thyroid antibodies were also negative and his thyroid ultrasound showed no significant abnormality so no further workup is needed unless he becomes symptomatic.  Will monitor thyroid level yearly now.    Patient voices no complaints today.    Hypertension  This is a chronic problem. The current episode started more than 1 year ago. The problem has been waxing and waning since onset. The problem is " controlled. Pertinent negatives include no anxiety, blurred vision, chest pain, headaches, malaise/fatigue, neck pain, orthopnea, palpitations, peripheral edema, PND, shortness of breath or sweats. There are no associated agents to hypertension. Risk factors for coronary artery disease include family history and male gender. Past treatments include ACE inhibitors, lifestyle changes and calcium channel blockers. The current treatment provides significant improvement. There are no compliance problems.      Component      Latest Ref Rng & Units 2/24/2020 6/17/2019 10/11/2018 6/20/2018   WBC      3.90 - 12.70 K/uL 9.31 10.50 9.63 7.03   RBC      4.60 - 6.20 M/uL 4.58 (L) 4.22 (L) 4.61 4.49 (L)   Hemoglobin      14.0 - 18.0 g/dL 13.9 (L) 13.1 (L) 14.4 13.9 (L)   Hematocrit      40.0 - 54.0 % 43.5 38.1 (L) 42.6 41.3   MCV      82 - 98 fL 95 90 92 92   MCH      27.0 - 31.0 pg 30.3 31.0 31.2 (H) 31.0   MCHC      32.0 - 36.0 g/dL 32.0 34.4 33.8 33.7   RDW      11.5 - 14.5 % 14.2 13.8 14.0 14.4   Platelets      150 - 350 K/uL 190 178 199 169   MPV      9.2 - 12.9 fL 11.9 11.9 12.4 11.6   Immature Granulocytes      0.0 - 0.5 % 0.2      Gran # (ANC)      1.8 - 7.7 K/uL 5.2 6.0 5.5 3.4   Immature Grans (Abs)      0.00 - 0.04 K/uL 0.02      Lymph #      1.0 - 4.8 K/uL 2.8 3.3 2.9 2.8   Mono #      0.3 - 1.0 K/uL 1.0 1.1 (H) 1.0 0.6   Eos #      0.0 - 0.5 K/uL 0.3 0.1 0.2 0.1   Baso #      0.00 - 0.20 K/uL 0.05 0.03 0.05 0.04   nRBC      0 /100 WBC 0      Gran%      38.0 - 73.0 % 55.6 57.0 57.2 48.5   Lymph%      18.0 - 48.0 % 30.1 31.0 29.9 40.0   Mono%      4.0 - 15.0 % 10.8 10.8 10.8 9.1   Eosinophil%      0.0 - 8.0 % 2.8 0.9 1.6 1.8   Basophil%      0.0 - 1.9 % 0.5 0.3 0.5 0.6   Differential Method       Automated Automated Automated Automated   Iron      45 - 160 ug/dL 49      Transferrin      200 - 375 mg/dL 231      TIBC      250 - 450 ug/dL 342      Saturated Iron      20 - 50 % 14 (L)      Ferritin      20.0 - 300.0 ng/mL  110      Vitamin B-12      210 - 950 pg/mL 447      Folate      4.0 - 24.0 ng/mL 9.7        Current Outpatient Medications   Medication Sig Dispense Refill    amLODIPine (NORVASC) 10 MG tablet Take 1 tablet (10 mg total) by mouth once daily. 30 tablet 0    lisinopriL-hydrochlorothiazide (PRINZIDE,ZESTORETIC) 10-12.5 mg per tablet Take 1 tablet by mouth once daily. 30 tablet 0    multivitamin capsule Take 1 capsule by mouth once daily.       No current facility-administered medications for this visit.        Past Medical History:   Diagnosis Date    Abnormal TSH 6/20/2018    Patient had a mildly low TSH in June 2018 but normal free T4 and asymtomatic.  Will continue to monitor levels    Hypertension     Mild anemia 6/20/2018    noted on wellness - started on multivitamin    Subclinical hyperthyroidism 10/19/2018    TSH remains low at 0.116 but rest of T3 and T4 labs are okay.  His thyroid antibodies are also negative and his thyroid ultrasound showed no significant abnormality so no further workup is needed unless he becomes symptomatic.  Will monitor thyroid level yearly now.     Systolic murmur     reports noted as a child - had evaluated by cardiologist in the past       Past Surgical History:   Procedure Laterality Date    HERNIA REPAIR      right inguinal hernia at age 12       Family History   Problem Relation Age of Onset    Hypertension Mother     Multiple sclerosis Mother     Diabetes Father     Hypertension Father     Kidney disease Father     No Known Problems Brother        Social History     Socioeconomic History    Marital status:      Spouse name: Not on file    Number of children: Not on file    Years of education: Not on file    Highest education level: Not on file   Occupational History    Occupation: supervisory position   Social Needs    Financial resource strain: Somewhat hard    Food insecurity     Worry: Never true     Inability: Never true    Transportation needs      Medical: No     Non-medical: No   Tobacco Use    Smoking status: Former Smoker     Packs/day: 1.00     Years: 21.00     Pack years: 21.00     Types: Cigars     Quit date: 2018     Years since quittin.4    Smokeless tobacco: Never Used   Substance and Sexual Activity    Alcohol use: Yes     Alcohol/week: 4.0 standard drinks     Types: 4 Cans of beer per week     Frequency: 2-4 times a month     Drinks per session: 3 or 4     Binge frequency: Less than monthly     Comment: every other weekend - 3 mixed drinks per occasion    Drug use: No    Sexual activity: Yes     Partners: Female     Birth control/protection: I.U.D.     Comment: My wife has IUD   Lifestyle    Physical activity     Days per week: 1 day     Minutes per session: 30 min    Stress: Not at all   Relationships    Social connections     Talks on phone: More than three times a week     Gets together: Twice a week     Attends Zoroastrianism service: Not on file     Active member of club or organization: Yes     Attends meetings of clubs or organizations: More than 4 times per year     Relationship status:    Other Topics Concern    Not on file   Social History Narrative    Not on file       Review of Systems   Constitutional: Negative for activity change, appetite change, fatigue, fever, malaise/fatigue and unexpected weight change.   HENT: Negative for congestion, ear pain, mouth sores, nosebleeds, postnasal drip, rhinorrhea, sinus pressure, sneezing, sore throat, trouble swallowing and voice change.    Eyes: Negative.  Negative for blurred vision.   Respiratory: Negative for cough, chest tightness and shortness of breath.    Cardiovascular: Negative for chest pain, palpitations, orthopnea, leg swelling and PND.   Gastrointestinal: Negative.  Negative for abdominal pain, blood in stool, constipation, diarrhea, nausea and vomiting.   Endocrine: Negative.    Genitourinary: Negative for difficulty urinating, dysuria, flank pain,  "hematuria and urgency.   Musculoskeletal: Negative for arthralgias, back pain, gait problem, joint swelling, myalgias and neck pain.   Skin: Negative for color change, rash and wound.   Allergic/Immunologic: Negative for immunocompromised state.   Neurological: Negative for dizziness, tremors, seizures, syncope, speech difficulty and headaches.   Hematological: Negative for adenopathy. Does not bruise/bleed easily.   Psychiatric/Behavioral: Negative for behavioral problems, dysphoric mood, sleep disturbance and suicidal ideas. The patient is not nervous/anxious.    All other systems reviewed and are negative.        Objective:     Vitals:    07/15/20 0712 07/15/20 0720   BP: 124/80 118/76   BP Location: Left arm    Patient Position: Sitting    BP Method: Medium (Manual)    Pulse: 77    Temp: 98 °F (36.7 °C)    TempSrc: Oral    SpO2: 98%    Weight: 88 kg (194 lb 1.8 oz)    Height: 5' 11" (1.803 m)           Physical Exam  Constitutional:       Appearance: He is well-developed.      Comments: Body mass index is 27.07 kg/m².     HENT:      Head: Normocephalic.      Right Ear: External ear normal.      Left Ear: External ear normal.      Nose: Nose normal.   Eyes:      General: No scleral icterus.        Right eye: No discharge.         Left eye: No discharge.      Pupils: Pupils are equal, round, and reactive to light.   Neck:      Musculoskeletal: Normal range of motion and neck supple.      Thyroid: No thyroid mass or thyromegaly.      Vascular: No JVD.      Trachea: No tracheal deviation.   Cardiovascular:      Rate and Rhythm: Normal rate and regular rhythm.      Heart sounds: Murmur present. Systolic murmur present with a grade of 1/6.      Comments: Grade 1/6 very mild Murmur to right sternal border  Pulmonary:      Effort: Pulmonary effort is normal. No respiratory distress.      Breath sounds: Normal breath sounds.   Abdominal:      General: There is no distension.      Palpations: Abdomen is soft. "   Musculoskeletal: Normal range of motion.   Lymphadenopathy:      Cervical: No cervical adenopathy.   Skin:     General: Skin is warm and dry.      Findings: No rash.   Neurological:      Mental Status: He is alert and oriented to person, place, and time.      Coordination: Coordination normal.   Psychiatric:         Behavior: Behavior normal.           Assessment:         ICD-10-CM ICD-9-CM   1. Essential hypertension  I10 401.9   2. Systolic murmur  R01.1 785.2   3. Mild anemia  D64.9 285.9   4. Abnormal iron saturation  R79.0 790.6   5. Subclinical hyperthyroidism  E05.90 242.90   6. Screening for HIV without presence of risk factors  Z11.4 V73.89   7. Screening cholesterol level  Z13.220 V77.91   8. Diabetes mellitus screening  Z13.1 V77.1   9. Encounter for blood test for routine general physical examination  Z00.00 V72.62       Plan:       Essential hypertension  -  continue lisinopril HCT and amlodipine at present doses.  Recheck in November/December 2020    Systolic murmur  -  minimal and unchanged    Mild anemia  -  monitor yearly at wellness visit  -     CBC auto differential; Future; Expected date: 07/15/2020    Abnormal iron saturation  -  monitor yearly at wellness visit  -     Iron and TIBC; Future; Expected date: 07/15/2020    Subclinical hyperthyroidism  -  monitored yearly at wellness visit.  Advised patient to make sure his multivitamin does not contain biotin.  If biotin is in the ingredients, then hold multivitamin for 1 week prior to blood draws.  -     TSH; Future; Expected date: 07/15/2020    Screening for HIV without presence of risk factors  -     HIV 1/2 Ag/Ab (4th Gen); Future; Expected date: 07/15/2020    Screening cholesterol level  -     Lipid Panel; Future; Expected date: 07/15/2020    Diabetes mellitus screening  -     Comprehensive metabolic panel; Future; Expected date: 07/15/2020    Encounter for blood test for routine general physical examination  -     CBC auto differential;  Future; Expected date: 07/15/2020  -     Comprehensive metabolic panel; Future; Expected date: 07/15/2020  -     Lipid Panel; Future; Expected date: 07/15/2020  -     TSH; Future; Expected date: 07/15/2020  -     HIV 1/2 Ag/Ab (4th Gen); Future; Expected date: 07/15/2020      Follow up in about 4 months (around 11/15/2020) for fasting labs and WELLNESS EXAM.     Patient's Medications   New Prescriptions    No medications on file   Previous Medications    AMLODIPINE (NORVASC) 10 MG TABLET    Take 1 tablet (10 mg total) by mouth once daily.    LISINOPRIL-HYDROCHLOROTHIAZIDE (PRINZIDE,ZESTORETIC) 10-12.5 MG PER TABLET    Take 1 tablet by mouth once daily.    MULTIVITAMIN CAPSULE    Take 1 capsule by mouth once daily.   Modified Medications    No medications on file   Discontinued Medications    No medications on file

## 2020-07-21 DIAGNOSIS — I10 ESSENTIAL HYPERTENSION: ICD-10-CM

## 2020-07-21 RX ORDER — AMLODIPINE BESYLATE 10 MG/1
10 TABLET ORAL DAILY
Qty: 30 TABLET | Refills: 5 | Status: SHIPPED | OUTPATIENT
Start: 2020-07-21 | End: 2021-01-07 | Stop reason: SDUPTHER

## 2020-07-21 RX ORDER — LISINOPRIL AND HYDROCHLOROTHIAZIDE 10; 12.5 MG/1; MG/1
1 TABLET ORAL DAILY
Qty: 30 TABLET | Refills: 5 | Status: SHIPPED | OUTPATIENT
Start: 2020-07-21 | End: 2020-07-23 | Stop reason: SDUPTHER

## 2020-11-30 ENCOUNTER — OFFICE VISIT (OUTPATIENT)
Dept: FAMILY MEDICINE | Facility: CLINIC | Age: 45
End: 2020-11-30
Payer: COMMERCIAL

## 2020-11-30 VITALS
HEART RATE: 76 BPM | DIASTOLIC BLOOD PRESSURE: 94 MMHG | RESPIRATION RATE: 16 BRPM | OXYGEN SATURATION: 96 % | BODY MASS INDEX: 27.54 KG/M2 | WEIGHT: 196.75 LBS | TEMPERATURE: 98 F | SYSTOLIC BLOOD PRESSURE: 140 MMHG | HEIGHT: 71 IN

## 2020-11-30 DIAGNOSIS — R01.1 SYSTOLIC MURMUR: ICD-10-CM

## 2020-11-30 DIAGNOSIS — Z00.00 ANNUAL PHYSICAL EXAM: Primary | ICD-10-CM

## 2020-11-30 DIAGNOSIS — I10 ESSENTIAL HYPERTENSION: ICD-10-CM

## 2020-11-30 DIAGNOSIS — R79.0 ABNORMAL IRON SATURATION: ICD-10-CM

## 2020-11-30 PROCEDURE — 99396 PREV VISIT EST AGE 40-64: CPT | Mod: S$GLB,,, | Performed by: NURSE PRACTITIONER

## 2020-11-30 PROCEDURE — 99999 PR PBB SHADOW E&M-EST. PATIENT-LVL IV: ICD-10-PCS | Mod: PBBFAC,,, | Performed by: NURSE PRACTITIONER

## 2020-11-30 PROCEDURE — 3077F SYST BP >= 140 MM HG: CPT | Mod: CPTII,S$GLB,, | Performed by: NURSE PRACTITIONER

## 2020-11-30 PROCEDURE — 99999 PR PBB SHADOW E&M-EST. PATIENT-LVL IV: CPT | Mod: PBBFAC,,, | Performed by: NURSE PRACTITIONER

## 2020-11-30 PROCEDURE — 3008F PR BODY MASS INDEX (BMI) DOCUMENTED: ICD-10-PCS | Mod: CPTII,S$GLB,, | Performed by: NURSE PRACTITIONER

## 2020-11-30 PROCEDURE — 99396 PR PREVENTIVE VISIT,EST,40-64: ICD-10-PCS | Mod: S$GLB,,, | Performed by: NURSE PRACTITIONER

## 2020-11-30 PROCEDURE — 3008F BODY MASS INDEX DOCD: CPT | Mod: CPTII,S$GLB,, | Performed by: NURSE PRACTITIONER

## 2020-11-30 PROCEDURE — 3080F PR MOST RECENT DIASTOLIC BLOOD PRESSURE >= 90 MM HG: ICD-10-PCS | Mod: CPTII,S$GLB,, | Performed by: NURSE PRACTITIONER

## 2020-11-30 PROCEDURE — 3077F PR MOST RECENT SYSTOLIC BLOOD PRESSURE >= 140 MM HG: ICD-10-PCS | Mod: CPTII,S$GLB,, | Performed by: NURSE PRACTITIONER

## 2020-11-30 PROCEDURE — 3080F DIAST BP >= 90 MM HG: CPT | Mod: CPTII,S$GLB,, | Performed by: NURSE PRACTITIONER

## 2020-11-30 NOTE — PROGRESS NOTES
"Subjective:       Patient ID: Salvador Pierce is a 45 y.o. male.    Chief Complaint: Annual Exam    HPI    Patient is a 45-year-old black male with hypertension, systolic murmur, mild anemia noted on wellness exam June 2018, an an abnormal TSH/subclinical hyperthyroidism noted on wellness exam in June 2018 that had normal thyroid panel that is here today for annual physical exam with fasting lab results.     Patient has Hypertension that is normally controlled on lisinopril 10 mg daily, HCTZ 12.5 mg daily and amlodipine 10 mg daily. HOWEVER, blood pressure is elevated today - he took medications around 30 minutes ago.  BP (!) 140/94   Pulse 76   Temp 97.9 °F (36.6 °C) (Temporal)   Resp 16   Ht 5' 11" (1.803 m)   Wt 89.3 kg (196 lb 12.2 oz)   SpO2 96%   BMI 27.44 kg/m²      Patient has a very mild grade 1/6 systolic murmur noted as a child and had evaluated by cardiologist in the past without any acute abnormalities.     Patient had a mild anemia noted on wellness exam in June 2018 that comes and goes with CBC monitoring.  On anemia workup, patient had a low iron saturation of 14% in February 2020 but ferritin level was normal and vitamin B12 and folate levels normal.  Patient was started on a multivitamin with iron daily.  The anemia is now RESOLVED.  The iron saturation is improved at now 19%.  Advised to continue MVI with iron supplement.     Patient had an abnormal TSH on wellness labs in June 2018.  His TSH was mildly low at 0.145 but his free T4 was within normal limits and patient denied any hyperthyroid symptoms at the time. His thyroid exam was unremarkable during physical exam.  Patient then seen in July 2019 and had full thyroid panel and thyroid ultrasound for evaluation.  TSH remained low at 0.116 but rest of T3 and T4 labs were okay.  His thyroid antibodies were also NEGATIVE and his thyroid ultrasound showed no significant abnormality so no further workup is needed unless he becomes symptomatic.  We " now monitor his thyroid labs yearly.  HIS TSH remains low at 0.138 with normal Free T4 1.31.    Wellness labs:  -  CBC normal - anemia is resolved and iron saturations remains mildly low but improved.  -  CMP all within range other than AST level of 56 - no history of elevations in past.  He does admit to increase alcohol over the holidays.  Will continue to monitor for now.  -  Cholesterol levels are within range.  -  Thyroid discussed above.    Health Maintenance:   - will get Hepatitis C screening with next blood draw.  -  HIV testing done with current labs but results are still processing.    Component      Latest Ref Rng & Units 11/27/2020 2/24/2020 6/17/2019   WBC      3.90 - 12.70 K/uL 9.15 9.31 10.50   RBC      4.60 - 6.20 M/uL 4.66 4.58 (L) 4.22 (L)   Hemoglobin      14.0 - 18.0 g/dL 14.4 13.9 (L) 13.1 (L)   Hematocrit      40.0 - 54.0 % 44.5 43.5 38.1 (L)   MCV      82 - 98 fL 96 95 90   MCH      27.0 - 31.0 pg 30.9 30.3 31.0   MCHC      32.0 - 36.0 g/dL 32.4 32.0 34.4   RDW      11.5 - 14.5 % 14.7 (H) 14.2 13.8   Platelets      150 - 350 K/uL 184 190 178   MPV      9.2 - 12.9 fL 12.5 11.9 11.9   Immature Granulocytes      0.0 - 0.5 % 0.2 0.2    Gran # (ANC)      1.8 - 7.7 K/uL 4.7 5.2 6.0   Immature Grans (Abs)      0.00 - 0.04 K/uL 0.02 0.02    Lymph #      1.0 - 4.8 K/uL 3.2 2.8 3.3   Mono #      0.3 - 1.0 K/uL 1.0 1.0 1.1 (H)   Eos #      0.0 - 0.5 K/uL 0.2 0.3 0.1   Baso #      0.00 - 0.20 K/uL 0.06 0.05 0.03   nRBC      0 /100 WBC 0 0    Gran %      38.0 - 73.0 % 51.0 55.6 57.0   Lymph %      18.0 - 48.0 % 34.8 30.1 31.0   Mono %      4.0 - 15.0 % 11.0 10.8 10.8   Eosinophil %      0.0 - 8.0 % 2.3 2.8 0.9   Basophil %      0.0 - 1.9 % 0.7 0.5 0.3   Differential Method       Automated Automated Automated   Sodium      136 - 145 mmol/L 141  141   Potassium      3.5 - 5.1 mmol/L 4.7  4.1   Chloride      95 - 110 mmol/L 110  108   CO2      23 - 29 mmol/L 26  22 (L)   Glucose      70 - 110 mg/dL 100  90    BUN      2 - 20 mg/dL 18  18   Creatinine      0.50 - 1.40 mg/dL 1.13  1.04   Calcium      8.7 - 10.5 mg/dL 9.8  9.3   PROTEIN TOTAL      6.0 - 8.4 g/dL 7.8  7.9   Albumin      3.5 - 5.2 g/dL 4.5  4.5   BILIRUBIN TOTAL      0.1 - 1.0 mg/dL 0.3  0.3   Alkaline Phosphatase      38 - 126 U/L 70  65   AST      15 - 46 U/L 56 (H)  24   ALT      10 - 44 U/L 22  21   Anion Gap      8 - 16 mmol/L 5 (L)  11   eGFR if African American      >60 mL/min/1.73 m:2 >60.0  >60.0   eGFR if non African American      >60 mL/min/1.73 m:2 >60.0  >60.0   Cholesterol      120 - 199 mg/dL 169  139   Triglycerides      30 - 150 mg/dL 84  76   HDL      40 - 75 mg/dL 52  47   LDL Cholesterol External      63.0 - 159.0 mg/dL 100.2  76.8   HDL/Cholesterol Ratio      20.0 - 50.0 % 30.8  33.8   Total Cholesterol/HDL Ratio      2.0 - 5.0 3.3  3.0   Non-HDL Cholesterol      mg/dL 117  92   Iron      45 - 160 ug/dL 66 49    Transferrin      200 - 375 mg/dL 236 231    TIBC      250 - 450 ug/dL 349 342    Saturated Iron      20 - 50 % 19 (L) 14 (L)    TSH      0.400 - 4.000 uIU/mL 0.138 (L)  0.116 (L)   Free T4      0.71 - 1.51 ng/dL 1.31  1.07     Current Outpatient Medications   Medication Sig Dispense Refill    amLODIPine (NORVASC) 10 MG tablet Take 1 tablet (10 mg total) by mouth once daily. 30 tablet 5    hydroCHLOROthiazide (HYDRODIURIL) 12.5 MG Tab Take 1 tablet by mouth once daily 30 tablet 5    lisinopriL 10 MG tablet Take 1 tablet by mouth daily 30 tablet 5    multivitamin capsule Take 1 capsule by mouth once daily.       No current facility-administered medications for this visit.        Past Medical History:   Diagnosis Date    Abnormal TSH 6/20/2018    Patient had a mildly low TSH in June 2018 but normal free T4 and asymtomatic.  Will continue to monitor levels    Hypertension     Mild anemia 6/20/2018    noted on wellness - started on multivitamin    Subclinical hyperthyroidism 10/19/2018    TSH remains low at 0.116 but rest of  T3 and T4 labs are okay.  His thyroid antibodies are also negative and his thyroid ultrasound showed no significant abnormality so no further workup is needed unless he becomes symptomatic.  Will monitor thyroid level yearly now.     Systolic murmur     reports noted as a child - had evaluated by cardiologist in the past       Past Surgical History:   Procedure Laterality Date    HERNIA REPAIR      right inguinal hernia at age 12       Family History   Problem Relation Age of Onset    Hypertension Mother     Multiple sclerosis Mother     Diabetes Father     Hypertension Father     Kidney disease Father     No Known Problems Brother        Social History     Socioeconomic History    Marital status:      Spouse name: Not on file    Number of children: Not on file    Years of education: Not on file    Highest education level: Not on file   Occupational History    Occupation: supervisory position   Social Needs    Financial resource strain: Somewhat hard    Food insecurity     Worry: Never true     Inability: Never true    Transportation needs     Medical: No     Non-medical: No   Tobacco Use    Smoking status: Former Smoker     Packs/day: 1.00     Years: 21.00     Pack years: 21.00     Types: Cigars     Quit date: 2018     Years since quittin.7    Smokeless tobacco: Never Used   Substance and Sexual Activity    Alcohol use: Yes     Alcohol/week: 4.0 standard drinks     Types: 4 Cans of beer per week     Frequency: 2-4 times a month     Drinks per session: 3 or 4     Binge frequency: Less than monthly     Comment: every other weekend - 3 mixed drinks per occasion    Drug use: No    Sexual activity: Yes     Partners: Female     Birth control/protection: I.U.D.     Comment: My wife has IUD   Lifestyle    Physical activity     Days per week: 1 day     Minutes per session: 30 min    Stress: Not at all   Relationships    Social connections     Talks on phone: More than three times a  "week     Gets together: Twice a week     Attends [employer] service: Not on file     Active member of club or organization: Yes     Attends meetings of clubs or organizations: More than 4 times per year     Relationship status:    Other Topics Concern    Not on file   Social History Narrative    Not on file       Review of Systems   Constitutional: Negative for activity change, appetite change, fatigue, fever and unexpected weight change.   HENT: Negative for congestion, ear pain, mouth sores, nosebleeds, postnasal drip, rhinorrhea, sinus pressure, sneezing, sore throat, trouble swallowing and voice change.    Eyes: Negative.    Respiratory: Negative for cough, chest tightness and shortness of breath.    Cardiovascular: Negative for chest pain, palpitations and leg swelling.   Gastrointestinal: Negative.  Negative for abdominal pain, blood in stool, constipation, diarrhea, nausea and vomiting.   Endocrine: Negative.    Genitourinary: Negative for difficulty urinating, dysuria, flank pain, hematuria and urgency.   Musculoskeletal: Negative for arthralgias, back pain, gait problem, joint swelling, myalgias and neck pain.   Skin: Negative for color change, rash and wound.   Allergic/Immunologic: Negative for immunocompromised state.   Neurological: Negative for dizziness, tremors, seizures, syncope, speech difficulty and headaches.   Hematological: Negative for adenopathy. Does not bruise/bleed easily.   Psychiatric/Behavioral: Negative for behavioral problems, dysphoric mood, sleep disturbance and suicidal ideas. The patient is not nervous/anxious.    All other systems reviewed and are negative.        Objective:     Vitals:    11/30/20 0710 11/30/20 0722   BP: (!) 146/98 (!) 140/94   BP Location: Left arm    Patient Position: Sitting    BP Method: Medium (Manual)    Pulse: 76    Resp: 16    Temp: 97.9 °F (36.6 °C)    TempSrc: Temporal    SpO2: 96%    Weight: 89.3 kg (196 lb 12.2 oz)    Height: 5' 11" (1.803 " m)           Physical Exam  Constitutional:       General: He is not in acute distress.     Appearance: He is well-developed. He is not ill-appearing, toxic-appearing or diaphoretic.      Comments: Body mass index is 27.44 kg/m².   HENT:      Head: Normocephalic and atraumatic.      Right Ear: Tympanic membrane and external ear normal.      Left Ear: Tympanic membrane and external ear normal.      Nose: Nose normal.   Eyes:      General: No scleral icterus.        Right eye: No discharge.         Left eye: No discharge.      Extraocular Movements: Extraocular movements intact.      Conjunctiva/sclera: Conjunctivae normal.   Neck:      Musculoskeletal: Normal range of motion and neck supple.      Thyroid: No thyroid mass or thyromegaly.      Vascular: No JVD.      Trachea: No tracheal deviation.   Cardiovascular:      Rate and Rhythm: Normal rate and regular rhythm.      Heart sounds: Murmur present. Systolic murmur present with a grade of 1/6.      Comments: Grade 1/6 very mild Murmur to right sternal border  Pulmonary:      Effort: Pulmonary effort is normal. No respiratory distress.      Breath sounds: Normal breath sounds. No stridor. No wheezing, rhonchi or rales.   Abdominal:      General: There is no distension.      Palpations: Abdomen is soft. There is no mass.      Tenderness: There is no abdominal tenderness. There is no guarding or rebound.      Hernia: No hernia is present.   Musculoskeletal: Normal range of motion.         General: No swelling or deformity.      Right lower leg: No edema.      Left lower leg: No edema.   Lymphadenopathy:      Cervical: No cervical adenopathy.   Skin:     General: Skin is warm and dry.      Findings: No rash.   Neurological:      Mental Status: He is alert and oriented to person, place, and time.      Coordination: Coordination normal.   Psychiatric:         Mood and Affect: Mood normal.         Behavior: Behavior normal.         Thought Content: Thought content normal.          Judgment: Judgment normal.           Assessment:         ICD-10-CM ICD-9-CM   1. Annual physical exam  Z00.00 V70.0   2. Essential hypertension  I10 401.9   3. Systolic murmur  R01.1 785.2   4. Abnormal iron saturation  R79.0 790.6       Plan:       Annual physical exam  Health Maintenance Summary    Hepatitis C Screening Overdue 1975    HIV Screening Overdue 2/15/1990  results pending   Lipid Panel Next Due 11/27/2025     Done 11/27/2020 LIPID PANEL    Done 6/17/2019 LIPID PANEL    Done 6/20/2018 LIPID PANEL   TETANUS VACCINE Next Due 10/3/2028     Done 10/3/2018 Imm Admin: Tdap   Influenza Vaccine This plan is no longer active.     Done 11/30/2020 [employer]: WORKFLOW - HEALTHY PLANET - EXTERNAL DATA - EXTERNAL PROCEDURE DATE - INFLUENZA    Done 10/26/2019 Imm Admin: Influenza    Done 10/3/2018 Imm Admin: Influenza - Quadrivalent - PF (6 months and older         Essential hypertension  -  Continue current medications as prescribed daily.  Monitor blood pressure at least three times weekly for the next month and record in MY [facility].  Return next month with HOME BP cuff - bring it to office for verification and further assessment.    Systolic murmur  -  unchanged    Abnormal iron saturation  -  ANEMIA RESOLVED.  Iron saturation improving - continue the multivitamin with iron    Other orders  -     [facility] Patient Entered Blood Pressure      Follow up in about 5 weeks (around 1/4/2021) for blood pressure check.     Patient's Medications   New Prescriptions    No medications on file   Previous Medications    AMLODIPINE (NORVASC) 10 MG TABLET    Take 1 tablet (10 mg total) by mouth once daily.    HYDROCHLOROTHIAZIDE (HYDRODIURIL) 12.5 MG TAB    Take 1 tablet by mouth once daily    LISINOPRIL 10 MG TABLET    Take 1 tablet by mouth daily    MULTIVITAMIN CAPSULE    Take 1 capsule by mouth once daily.   Modified Medications    No medications on file   Discontinued Medications    No medications on file

## 2021-01-07 ENCOUNTER — OFFICE VISIT (OUTPATIENT)
Dept: FAMILY MEDICINE | Facility: CLINIC | Age: 46
End: 2021-01-07
Payer: COMMERCIAL

## 2021-01-07 VITALS
SYSTOLIC BLOOD PRESSURE: 134 MMHG | RESPIRATION RATE: 16 BRPM | OXYGEN SATURATION: 99 % | DIASTOLIC BLOOD PRESSURE: 88 MMHG | HEIGHT: 71 IN | WEIGHT: 189.25 LBS | BODY MASS INDEX: 26.49 KG/M2 | TEMPERATURE: 98 F | HEART RATE: 70 BPM

## 2021-01-07 DIAGNOSIS — I10 ESSENTIAL HYPERTENSION: ICD-10-CM

## 2021-01-07 PROCEDURE — 99213 OFFICE O/P EST LOW 20 MIN: CPT | Mod: S$GLB,,, | Performed by: NURSE PRACTITIONER

## 2021-01-07 PROCEDURE — 3079F PR MOST RECENT DIASTOLIC BLOOD PRESSURE 80-89 MM HG: ICD-10-PCS | Mod: CPTII,S$GLB,, | Performed by: NURSE PRACTITIONER

## 2021-01-07 PROCEDURE — 1126F AMNT PAIN NOTED NONE PRSNT: CPT | Mod: S$GLB,,, | Performed by: NURSE PRACTITIONER

## 2021-01-07 PROCEDURE — 99999 PR PBB SHADOW E&M-EST. PATIENT-LVL III: ICD-10-PCS | Mod: PBBFAC,,, | Performed by: NURSE PRACTITIONER

## 2021-01-07 PROCEDURE — 99213 PR OFFICE/OUTPT VISIT, EST, LEVL III, 20-29 MIN: ICD-10-PCS | Mod: S$GLB,,, | Performed by: NURSE PRACTITIONER

## 2021-01-07 PROCEDURE — 3075F SYST BP GE 130 - 139MM HG: CPT | Mod: CPTII,S$GLB,, | Performed by: NURSE PRACTITIONER

## 2021-01-07 PROCEDURE — 3079F DIAST BP 80-89 MM HG: CPT | Mod: CPTII,S$GLB,, | Performed by: NURSE PRACTITIONER

## 2021-01-07 PROCEDURE — 3008F PR BODY MASS INDEX (BMI) DOCUMENTED: ICD-10-PCS | Mod: CPTII,S$GLB,, | Performed by: NURSE PRACTITIONER

## 2021-01-07 PROCEDURE — 3008F BODY MASS INDEX DOCD: CPT | Mod: CPTII,S$GLB,, | Performed by: NURSE PRACTITIONER

## 2021-01-07 PROCEDURE — 1126F PR PAIN SEVERITY QUANTIFIED, NO PAIN PRESENT: ICD-10-PCS | Mod: S$GLB,,, | Performed by: NURSE PRACTITIONER

## 2021-01-07 PROCEDURE — 3075F PR MOST RECENT SYSTOLIC BLOOD PRESS GE 130-139MM HG: ICD-10-PCS | Mod: CPTII,S$GLB,, | Performed by: NURSE PRACTITIONER

## 2021-01-07 PROCEDURE — 99999 PR PBB SHADOW E&M-EST. PATIENT-LVL III: CPT | Mod: PBBFAC,,, | Performed by: NURSE PRACTITIONER

## 2021-01-07 RX ORDER — LISINOPRIL AND HYDROCHLOROTHIAZIDE 12.5; 2 MG/1; MG/1
1 TABLET ORAL DAILY
Qty: 30 TABLET | Refills: 0 | Status: SHIPPED | OUTPATIENT
Start: 2021-01-07 | End: 2021-02-02 | Stop reason: DRUGHIGH

## 2021-01-07 RX ORDER — AMLODIPINE BESYLATE 10 MG/1
10 TABLET ORAL DAILY
Qty: 30 TABLET | Refills: 0 | Status: SHIPPED | OUTPATIENT
Start: 2021-01-07 | End: 2021-02-02 | Stop reason: SDUPTHER

## 2021-01-15 ENCOUNTER — PATIENT MESSAGE (OUTPATIENT)
Dept: FAMILY MEDICINE | Facility: CLINIC | Age: 46
End: 2021-01-15

## 2021-01-15 RX ORDER — VALACYCLOVIR HYDROCHLORIDE 1 G/1
1000 TABLET, FILM COATED ORAL EVERY 12 HOURS
Qty: 30 TABLET | Refills: 1 | Status: SHIPPED | OUTPATIENT
Start: 2021-01-15 | End: 2022-04-20 | Stop reason: ALTCHOICE

## 2021-02-02 ENCOUNTER — OFFICE VISIT (OUTPATIENT)
Dept: FAMILY MEDICINE | Facility: CLINIC | Age: 46
End: 2021-02-02
Payer: COMMERCIAL

## 2021-02-02 VITALS
BODY MASS INDEX: 27.08 KG/M2 | HEART RATE: 69 BPM | HEIGHT: 71 IN | SYSTOLIC BLOOD PRESSURE: 136 MMHG | RESPIRATION RATE: 16 BRPM | WEIGHT: 193.44 LBS | DIASTOLIC BLOOD PRESSURE: 86 MMHG | OXYGEN SATURATION: 99 % | TEMPERATURE: 98 F

## 2021-02-02 DIAGNOSIS — I10 ESSENTIAL HYPERTENSION: ICD-10-CM

## 2021-02-02 PROCEDURE — 3075F PR MOST RECENT SYSTOLIC BLOOD PRESS GE 130-139MM HG: ICD-10-PCS | Mod: CPTII,S$GLB,, | Performed by: NURSE PRACTITIONER

## 2021-02-02 PROCEDURE — 1126F AMNT PAIN NOTED NONE PRSNT: CPT | Mod: S$GLB,,, | Performed by: NURSE PRACTITIONER

## 2021-02-02 PROCEDURE — 3079F PR MOST RECENT DIASTOLIC BLOOD PRESSURE 80-89 MM HG: ICD-10-PCS | Mod: CPTII,S$GLB,, | Performed by: NURSE PRACTITIONER

## 2021-02-02 PROCEDURE — 99999 PR PBB SHADOW E&M-EST. PATIENT-LVL IV: CPT | Mod: PBBFAC,,, | Performed by: NURSE PRACTITIONER

## 2021-02-02 PROCEDURE — 99999 PR PBB SHADOW E&M-EST. PATIENT-LVL IV: ICD-10-PCS | Mod: PBBFAC,,, | Performed by: NURSE PRACTITIONER

## 2021-02-02 PROCEDURE — 3075F SYST BP GE 130 - 139MM HG: CPT | Mod: CPTII,S$GLB,, | Performed by: NURSE PRACTITIONER

## 2021-02-02 PROCEDURE — 1126F PR PAIN SEVERITY QUANTIFIED, NO PAIN PRESENT: ICD-10-PCS | Mod: S$GLB,,, | Performed by: NURSE PRACTITIONER

## 2021-02-02 PROCEDURE — 3008F PR BODY MASS INDEX (BMI) DOCUMENTED: ICD-10-PCS | Mod: CPTII,S$GLB,, | Performed by: NURSE PRACTITIONER

## 2021-02-02 PROCEDURE — 99214 OFFICE O/P EST MOD 30 MIN: CPT | Mod: S$GLB,,, | Performed by: NURSE PRACTITIONER

## 2021-02-02 PROCEDURE — 99214 PR OFFICE/OUTPT VISIT, EST, LEVL IV, 30-39 MIN: ICD-10-PCS | Mod: S$GLB,,, | Performed by: NURSE PRACTITIONER

## 2021-02-02 PROCEDURE — 3008F BODY MASS INDEX DOCD: CPT | Mod: CPTII,S$GLB,, | Performed by: NURSE PRACTITIONER

## 2021-02-02 PROCEDURE — 3079F DIAST BP 80-89 MM HG: CPT | Mod: CPTII,S$GLB,, | Performed by: NURSE PRACTITIONER

## 2021-02-02 RX ORDER — LISINOPRIL 40 MG/1
40 TABLET ORAL DAILY
Qty: 30 TABLET | Refills: 0 | Status: SHIPPED | OUTPATIENT
Start: 2021-02-02 | End: 2021-03-04 | Stop reason: SINTOL

## 2021-02-02 RX ORDER — AMLODIPINE BESYLATE 10 MG/1
10 TABLET ORAL DAILY
Qty: 30 TABLET | Refills: 0 | Status: SHIPPED | OUTPATIENT
Start: 2021-02-02 | End: 2021-03-04 | Stop reason: SDUPTHER

## 2021-03-04 ENCOUNTER — OFFICE VISIT (OUTPATIENT)
Dept: FAMILY MEDICINE | Facility: CLINIC | Age: 46
End: 2021-03-04
Payer: COMMERCIAL

## 2021-03-04 VITALS
BODY MASS INDEX: 27.22 KG/M2 | DIASTOLIC BLOOD PRESSURE: 84 MMHG | HEIGHT: 71 IN | WEIGHT: 194.44 LBS | SYSTOLIC BLOOD PRESSURE: 132 MMHG | OXYGEN SATURATION: 99 % | HEART RATE: 84 BPM | TEMPERATURE: 98 F | RESPIRATION RATE: 16 BRPM

## 2021-03-04 DIAGNOSIS — I10 ESSENTIAL HYPERTENSION: Primary | ICD-10-CM

## 2021-03-04 PROCEDURE — 3075F PR MOST RECENT SYSTOLIC BLOOD PRESS GE 130-139MM HG: ICD-10-PCS | Mod: CPTII,S$GLB,, | Performed by: NURSE PRACTITIONER

## 2021-03-04 PROCEDURE — 3008F BODY MASS INDEX DOCD: CPT | Mod: CPTII,S$GLB,, | Performed by: NURSE PRACTITIONER

## 2021-03-04 PROCEDURE — 99999 PR PBB SHADOW E&M-EST. PATIENT-LVL IV: CPT | Mod: PBBFAC,,, | Performed by: NURSE PRACTITIONER

## 2021-03-04 PROCEDURE — 99999 PR PBB SHADOW E&M-EST. PATIENT-LVL IV: ICD-10-PCS | Mod: PBBFAC,,, | Performed by: NURSE PRACTITIONER

## 2021-03-04 PROCEDURE — 1126F PR PAIN SEVERITY QUANTIFIED, NO PAIN PRESENT: ICD-10-PCS | Mod: S$GLB,,, | Performed by: NURSE PRACTITIONER

## 2021-03-04 PROCEDURE — 1126F AMNT PAIN NOTED NONE PRSNT: CPT | Mod: S$GLB,,, | Performed by: NURSE PRACTITIONER

## 2021-03-04 PROCEDURE — 3008F PR BODY MASS INDEX (BMI) DOCUMENTED: ICD-10-PCS | Mod: CPTII,S$GLB,, | Performed by: NURSE PRACTITIONER

## 2021-03-04 PROCEDURE — 99214 OFFICE O/P EST MOD 30 MIN: CPT | Mod: S$GLB,,, | Performed by: NURSE PRACTITIONER

## 2021-03-04 PROCEDURE — 99214 PR OFFICE/OUTPT VISIT, EST, LEVL IV, 30-39 MIN: ICD-10-PCS | Mod: S$GLB,,, | Performed by: NURSE PRACTITIONER

## 2021-03-04 PROCEDURE — 3075F SYST BP GE 130 - 139MM HG: CPT | Mod: CPTII,S$GLB,, | Performed by: NURSE PRACTITIONER

## 2021-03-04 PROCEDURE — 3079F PR MOST RECENT DIASTOLIC BLOOD PRESSURE 80-89 MM HG: ICD-10-PCS | Mod: CPTII,S$GLB,, | Performed by: NURSE PRACTITIONER

## 2021-03-04 PROCEDURE — 3079F DIAST BP 80-89 MM HG: CPT | Mod: CPTII,S$GLB,, | Performed by: NURSE PRACTITIONER

## 2021-03-04 RX ORDER — AMLODIPINE BESYLATE 10 MG/1
10 TABLET ORAL DAILY
Qty: 30 TABLET | Refills: 0 | Status: SHIPPED | OUTPATIENT
Start: 2021-03-04 | End: 2021-04-01 | Stop reason: SDUPTHER

## 2021-03-04 RX ORDER — VALSARTAN 320 MG/1
320 TABLET ORAL DAILY
Qty: 30 TABLET | Refills: 0 | Status: SHIPPED | OUTPATIENT
Start: 2021-03-04 | End: 2021-04-01 | Stop reason: SDUPTHER

## 2021-03-20 ENCOUNTER — IMMUNIZATION (OUTPATIENT)
Dept: PRIMARY CARE CLINIC | Facility: CLINIC | Age: 46
End: 2021-03-20
Payer: COMMERCIAL

## 2021-03-20 DIAGNOSIS — Z23 NEED FOR VACCINATION: Primary | ICD-10-CM

## 2021-03-20 PROCEDURE — 0001A PR IMMUNIZ ADMIN, SARS-COV-2 COVID-19 VACC, 30MCG/0.3ML, 1ST DOSE: CPT | Mod: CV19,S$GLB,, | Performed by: INTERNAL MEDICINE

## 2021-03-20 PROCEDURE — 91300 PR SARS-COV- 2 COVID-19 VACCINE, NO PRSV, 30MCG/0.3ML, IM: ICD-10-PCS | Mod: S$GLB,,, | Performed by: INTERNAL MEDICINE

## 2021-03-20 PROCEDURE — 0001A PR IMMUNIZ ADMIN, SARS-COV-2 COVID-19 VACC, 30MCG/0.3ML, 1ST DOSE: ICD-10-PCS | Mod: CV19,S$GLB,, | Performed by: INTERNAL MEDICINE

## 2021-03-20 PROCEDURE — 91300 PR SARS-COV- 2 COVID-19 VACCINE, NO PRSV, 30MCG/0.3ML, IM: CPT | Mod: S$GLB,,, | Performed by: INTERNAL MEDICINE

## 2021-03-20 RX ADMIN — Medication 0.3 ML: at 10:03

## 2021-04-01 ENCOUNTER — OFFICE VISIT (OUTPATIENT)
Dept: FAMILY MEDICINE | Facility: CLINIC | Age: 46
End: 2021-04-01
Payer: COMMERCIAL

## 2021-04-01 VITALS
TEMPERATURE: 98 F | HEIGHT: 71 IN | SYSTOLIC BLOOD PRESSURE: 120 MMHG | DIASTOLIC BLOOD PRESSURE: 76 MMHG | BODY MASS INDEX: 26.94 KG/M2 | WEIGHT: 192.44 LBS | HEART RATE: 63 BPM

## 2021-04-01 DIAGNOSIS — I10 ESSENTIAL HYPERTENSION: ICD-10-CM

## 2021-04-01 PROCEDURE — 3008F BODY MASS INDEX DOCD: CPT | Mod: CPTII,S$GLB,, | Performed by: NURSE PRACTITIONER

## 2021-04-01 PROCEDURE — 3078F PR MOST RECENT DIASTOLIC BLOOD PRESSURE < 80 MM HG: ICD-10-PCS | Mod: CPTII,S$GLB,, | Performed by: NURSE PRACTITIONER

## 2021-04-01 PROCEDURE — 99213 OFFICE O/P EST LOW 20 MIN: CPT | Mod: S$GLB,,, | Performed by: NURSE PRACTITIONER

## 2021-04-01 PROCEDURE — 99999 PR PBB SHADOW E&M-EST. PATIENT-LVL III: ICD-10-PCS | Mod: PBBFAC,,, | Performed by: NURSE PRACTITIONER

## 2021-04-01 PROCEDURE — 99999 PR PBB SHADOW E&M-EST. PATIENT-LVL III: CPT | Mod: PBBFAC,,, | Performed by: NURSE PRACTITIONER

## 2021-04-01 PROCEDURE — 3074F SYST BP LT 130 MM HG: CPT | Mod: CPTII,S$GLB,, | Performed by: NURSE PRACTITIONER

## 2021-04-01 PROCEDURE — 99213 PR OFFICE/OUTPT VISIT, EST, LEVL III, 20-29 MIN: ICD-10-PCS | Mod: S$GLB,,, | Performed by: NURSE PRACTITIONER

## 2021-04-01 PROCEDURE — 3008F PR BODY MASS INDEX (BMI) DOCUMENTED: ICD-10-PCS | Mod: CPTII,S$GLB,, | Performed by: NURSE PRACTITIONER

## 2021-04-01 PROCEDURE — 3074F PR MOST RECENT SYSTOLIC BLOOD PRESSURE < 130 MM HG: ICD-10-PCS | Mod: CPTII,S$GLB,, | Performed by: NURSE PRACTITIONER

## 2021-04-01 PROCEDURE — 3078F DIAST BP <80 MM HG: CPT | Mod: CPTII,S$GLB,, | Performed by: NURSE PRACTITIONER

## 2021-04-01 RX ORDER — AMLODIPINE BESYLATE 10 MG/1
10 TABLET ORAL DAILY
Qty: 30 TABLET | Refills: 3 | Status: SHIPPED | OUTPATIENT
Start: 2021-04-01 | End: 2021-08-03 | Stop reason: ALTCHOICE

## 2021-04-01 RX ORDER — VALSARTAN 320 MG/1
320 TABLET ORAL DAILY
Qty: 30 TABLET | Refills: 3 | Status: SHIPPED | OUTPATIENT
Start: 2021-04-01 | End: 2021-08-03 | Stop reason: ALTCHOICE

## 2021-04-11 ENCOUNTER — IMMUNIZATION (OUTPATIENT)
Dept: PRIMARY CARE CLINIC | Facility: CLINIC | Age: 46
End: 2021-04-11
Payer: COMMERCIAL

## 2021-04-11 DIAGNOSIS — Z23 NEED FOR VACCINATION: Primary | ICD-10-CM

## 2021-04-11 PROCEDURE — 0002A PR IMMUNIZ ADMIN, SARS-COV-2 COVID-19 VACC, 30MCG/0.3ML, 2ND DOSE: CPT | Mod: CV19,S$GLB,, | Performed by: INTERNAL MEDICINE

## 2021-04-11 PROCEDURE — 91300 PR SARS-COV- 2 COVID-19 VACCINE, NO PRSV, 30MCG/0.3ML, IM: ICD-10-PCS | Mod: S$GLB,,, | Performed by: INTERNAL MEDICINE

## 2021-04-11 PROCEDURE — 0002A PR IMMUNIZ ADMIN, SARS-COV-2 COVID-19 VACC, 30MCG/0.3ML, 2ND DOSE: ICD-10-PCS | Mod: CV19,S$GLB,, | Performed by: INTERNAL MEDICINE

## 2021-04-11 PROCEDURE — 91300 PR SARS-COV- 2 COVID-19 VACCINE, NO PRSV, 30MCG/0.3ML, IM: CPT | Mod: S$GLB,,, | Performed by: INTERNAL MEDICINE

## 2021-04-11 RX ADMIN — Medication 0.3 ML: at 11:04

## 2021-08-03 ENCOUNTER — OFFICE VISIT (OUTPATIENT)
Dept: FAMILY MEDICINE | Facility: CLINIC | Age: 46
End: 2021-08-03
Payer: COMMERCIAL

## 2021-08-03 VITALS
HEART RATE: 67 BPM | SYSTOLIC BLOOD PRESSURE: 142 MMHG | OXYGEN SATURATION: 96 % | WEIGHT: 201.63 LBS | TEMPERATURE: 98 F | BODY MASS INDEX: 28.23 KG/M2 | RESPIRATION RATE: 16 BRPM | DIASTOLIC BLOOD PRESSURE: 90 MMHG | HEIGHT: 71 IN

## 2021-08-03 DIAGNOSIS — I10 ESSENTIAL HYPERTENSION: Primary | ICD-10-CM

## 2021-08-03 DIAGNOSIS — Z91.148 NONCOMPLIANCE WITH MEDICATION REGIMEN: ICD-10-CM

## 2021-08-03 PROCEDURE — 3008F BODY MASS INDEX DOCD: CPT | Mod: CPTII,S$GLB,, | Performed by: NURSE PRACTITIONER

## 2021-08-03 PROCEDURE — 3077F SYST BP >= 140 MM HG: CPT | Mod: CPTII,S$GLB,, | Performed by: NURSE PRACTITIONER

## 2021-08-03 PROCEDURE — 1159F MED LIST DOCD IN RCRD: CPT | Mod: CPTII,S$GLB,, | Performed by: NURSE PRACTITIONER

## 2021-08-03 PROCEDURE — 1160F RVW MEDS BY RX/DR IN RCRD: CPT | Mod: CPTII,S$GLB,, | Performed by: NURSE PRACTITIONER

## 2021-08-03 PROCEDURE — 3077F PR MOST RECENT SYSTOLIC BLOOD PRESSURE >= 140 MM HG: ICD-10-PCS | Mod: CPTII,S$GLB,, | Performed by: NURSE PRACTITIONER

## 2021-08-03 PROCEDURE — 99214 OFFICE O/P EST MOD 30 MIN: CPT | Mod: S$GLB,,, | Performed by: NURSE PRACTITIONER

## 2021-08-03 PROCEDURE — 1160F PR REVIEW ALL MEDS BY PRESCRIBER/CLIN PHARMACIST DOCUMENTED: ICD-10-PCS | Mod: CPTII,S$GLB,, | Performed by: NURSE PRACTITIONER

## 2021-08-03 PROCEDURE — 99214 PR OFFICE/OUTPT VISIT, EST, LEVL IV, 30-39 MIN: ICD-10-PCS | Mod: S$GLB,,, | Performed by: NURSE PRACTITIONER

## 2021-08-03 PROCEDURE — 3008F PR BODY MASS INDEX (BMI) DOCUMENTED: ICD-10-PCS | Mod: CPTII,S$GLB,, | Performed by: NURSE PRACTITIONER

## 2021-08-03 PROCEDURE — 99999 PR PBB SHADOW E&M-EST. PATIENT-LVL III: ICD-10-PCS | Mod: PBBFAC,,, | Performed by: NURSE PRACTITIONER

## 2021-08-03 PROCEDURE — 3080F PR MOST RECENT DIASTOLIC BLOOD PRESSURE >= 90 MM HG: ICD-10-PCS | Mod: CPTII,S$GLB,, | Performed by: NURSE PRACTITIONER

## 2021-08-03 PROCEDURE — 3080F DIAST BP >= 90 MM HG: CPT | Mod: CPTII,S$GLB,, | Performed by: NURSE PRACTITIONER

## 2021-08-03 PROCEDURE — 99999 PR PBB SHADOW E&M-EST. PATIENT-LVL III: CPT | Mod: PBBFAC,,, | Performed by: NURSE PRACTITIONER

## 2021-08-03 PROCEDURE — 1159F PR MEDICATION LIST DOCUMENTED IN MEDICAL RECORD: ICD-10-PCS | Mod: CPTII,S$GLB,, | Performed by: NURSE PRACTITIONER

## 2021-08-03 RX ORDER — AMLODIPINE AND VALSARTAN 10; 320 MG/1; MG/1
1 TABLET ORAL DAILY
Qty: 30 TABLET | Refills: 0 | Status: SHIPPED | OUTPATIENT
Start: 2021-08-03 | End: 2021-09-20 | Stop reason: SDUPTHER

## 2021-09-20 DIAGNOSIS — I10 ESSENTIAL HYPERTENSION: ICD-10-CM

## 2021-09-20 RX ORDER — AMLODIPINE AND VALSARTAN 10; 320 MG/1; MG/1
1 TABLET ORAL DAILY
Qty: 30 TABLET | Refills: 0 | Status: SHIPPED | OUTPATIENT
Start: 2021-09-20 | End: 2021-11-02 | Stop reason: SDUPTHER

## 2021-09-30 ENCOUNTER — PATIENT OUTREACH (OUTPATIENT)
Dept: ADMINISTRATIVE | Facility: HOSPITAL | Age: 46
End: 2021-09-30

## 2021-09-30 ENCOUNTER — PATIENT MESSAGE (OUTPATIENT)
Dept: ADMINISTRATIVE | Facility: HOSPITAL | Age: 46
End: 2021-09-30

## 2021-10-14 ENCOUNTER — IMMUNIZATION (OUTPATIENT)
Dept: INTERNAL MEDICINE | Facility: CLINIC | Age: 46
End: 2021-10-14
Payer: COMMERCIAL

## 2021-10-14 DIAGNOSIS — Z23 NEED FOR VACCINATION: Primary | ICD-10-CM

## 2021-10-14 PROCEDURE — 91300 COVID-19, MRNA, LNP-S, PF, 30 MCG/0.3 ML DOSE VACCINE: CPT | Mod: PBBFAC | Performed by: FAMILY MEDICINE

## 2021-10-14 PROCEDURE — 0003A COVID-19, MRNA, LNP-S, PF, 30 MCG/0.3 ML DOSE VACCINE: CPT | Mod: PBBFAC | Performed by: FAMILY MEDICINE

## 2021-10-19 ENCOUNTER — PATIENT MESSAGE (OUTPATIENT)
Dept: FAMILY MEDICINE | Facility: CLINIC | Age: 46
End: 2021-10-19
Payer: COMMERCIAL

## 2021-10-19 ENCOUNTER — PATIENT MESSAGE (OUTPATIENT)
Dept: FAMILY MEDICINE | Facility: CLINIC | Age: 46
End: 2021-10-19

## 2021-10-28 ENCOUNTER — PATIENT MESSAGE (OUTPATIENT)
Dept: ADMINISTRATIVE | Facility: HOSPITAL | Age: 46
End: 2021-10-28
Payer: COMMERCIAL

## 2021-10-28 ENCOUNTER — PATIENT OUTREACH (OUTPATIENT)
Dept: ADMINISTRATIVE | Facility: HOSPITAL | Age: 46
End: 2021-10-28
Payer: COMMERCIAL

## 2021-11-02 DIAGNOSIS — I10 ESSENTIAL HYPERTENSION: ICD-10-CM

## 2021-11-02 RX ORDER — AMLODIPINE AND VALSARTAN 10; 320 MG/1; MG/1
1 TABLET ORAL DAILY
Qty: 30 TABLET | Refills: 0 | Status: SHIPPED | OUTPATIENT
Start: 2021-11-02 | End: 2021-12-20 | Stop reason: SDUPTHER

## 2021-12-20 DIAGNOSIS — I10 ESSENTIAL HYPERTENSION: ICD-10-CM

## 2021-12-20 RX ORDER — AMLODIPINE AND VALSARTAN 10; 320 MG/1; MG/1
1 TABLET ORAL DAILY
Qty: 30 TABLET | Refills: 0 | Status: SHIPPED | OUTPATIENT
Start: 2021-12-20 | End: 2022-01-19 | Stop reason: SDUPTHER

## 2021-12-26 ENCOUNTER — PATIENT OUTREACH (OUTPATIENT)
Dept: ADMINISTRATIVE | Facility: OTHER | Age: 46
End: 2021-12-26

## 2021-12-29 ENCOUNTER — TELEPHONE (OUTPATIENT)
Dept: DERMATOLOGY | Facility: CLINIC | Age: 46
End: 2021-12-29

## 2022-01-19 ENCOUNTER — OFFICE VISIT (OUTPATIENT)
Dept: FAMILY MEDICINE | Facility: CLINIC | Age: 47
End: 2022-01-19
Payer: COMMERCIAL

## 2022-01-19 VITALS
HEART RATE: 87 BPM | SYSTOLIC BLOOD PRESSURE: 142 MMHG | RESPIRATION RATE: 18 BRPM | DIASTOLIC BLOOD PRESSURE: 90 MMHG | WEIGHT: 205 LBS | HEIGHT: 71 IN | BODY MASS INDEX: 28.7 KG/M2 | TEMPERATURE: 98 F | OXYGEN SATURATION: 98 %

## 2022-01-19 DIAGNOSIS — I10 ESSENTIAL HYPERTENSION: Primary | ICD-10-CM

## 2022-01-19 DIAGNOSIS — Z00.00 ENCOUNTER FOR BLOOD TEST FOR ROUTINE GENERAL PHYSICAL EXAMINATION: ICD-10-CM

## 2022-01-19 DIAGNOSIS — Z13.220 SCREENING CHOLESTEROL LEVEL: ICD-10-CM

## 2022-01-19 DIAGNOSIS — Z11.59 ENCOUNTER FOR HEPATITIS C SCREENING TEST FOR LOW RISK PATIENT: ICD-10-CM

## 2022-01-19 DIAGNOSIS — Z13.1 DIABETES MELLITUS SCREENING: ICD-10-CM

## 2022-01-19 DIAGNOSIS — E05.90 SUBCLINICAL HYPERTHYROIDISM: ICD-10-CM

## 2022-01-19 DIAGNOSIS — Z12.11 COLON CANCER SCREENING: ICD-10-CM

## 2022-01-19 DIAGNOSIS — Z13.0 SCREENING FOR DEFICIENCY ANEMIA: ICD-10-CM

## 2022-01-19 DIAGNOSIS — Z86.2 HISTORY OF ANEMIA: ICD-10-CM

## 2022-01-19 PROCEDURE — 3077F PR MOST RECENT SYSTOLIC BLOOD PRESSURE >= 140 MM HG: ICD-10-PCS | Mod: CPTII,S$GLB,, | Performed by: NURSE PRACTITIONER

## 2022-01-19 PROCEDURE — 3077F SYST BP >= 140 MM HG: CPT | Mod: CPTII,S$GLB,, | Performed by: NURSE PRACTITIONER

## 2022-01-19 PROCEDURE — 1160F RVW MEDS BY RX/DR IN RCRD: CPT | Mod: CPTII,S$GLB,, | Performed by: NURSE PRACTITIONER

## 2022-01-19 PROCEDURE — 3080F DIAST BP >= 90 MM HG: CPT | Mod: CPTII,S$GLB,, | Performed by: NURSE PRACTITIONER

## 2022-01-19 PROCEDURE — 1159F MED LIST DOCD IN RCRD: CPT | Mod: CPTII,S$GLB,, | Performed by: NURSE PRACTITIONER

## 2022-01-19 PROCEDURE — 99213 PR OFFICE/OUTPT VISIT, EST, LEVL III, 20-29 MIN: ICD-10-PCS | Mod: S$GLB,,, | Performed by: NURSE PRACTITIONER

## 2022-01-19 PROCEDURE — 1160F PR REVIEW ALL MEDS BY PRESCRIBER/CLIN PHARMACIST DOCUMENTED: ICD-10-PCS | Mod: CPTII,S$GLB,, | Performed by: NURSE PRACTITIONER

## 2022-01-19 PROCEDURE — 4010F PR ACE/ARB THEARPY RXD/TAKEN: ICD-10-PCS | Mod: CPTII,S$GLB,, | Performed by: NURSE PRACTITIONER

## 2022-01-19 PROCEDURE — 99213 OFFICE O/P EST LOW 20 MIN: CPT | Mod: S$GLB,,, | Performed by: NURSE PRACTITIONER

## 2022-01-19 PROCEDURE — 1159F PR MEDICATION LIST DOCUMENTED IN MEDICAL RECORD: ICD-10-PCS | Mod: CPTII,S$GLB,, | Performed by: NURSE PRACTITIONER

## 2022-01-19 PROCEDURE — 3008F PR BODY MASS INDEX (BMI) DOCUMENTED: ICD-10-PCS | Mod: CPTII,S$GLB,, | Performed by: NURSE PRACTITIONER

## 2022-01-19 PROCEDURE — 3080F PR MOST RECENT DIASTOLIC BLOOD PRESSURE >= 90 MM HG: ICD-10-PCS | Mod: CPTII,S$GLB,, | Performed by: NURSE PRACTITIONER

## 2022-01-19 PROCEDURE — 4010F ACE/ARB THERAPY RXD/TAKEN: CPT | Mod: CPTII,S$GLB,, | Performed by: NURSE PRACTITIONER

## 2022-01-19 PROCEDURE — 3008F BODY MASS INDEX DOCD: CPT | Mod: CPTII,S$GLB,, | Performed by: NURSE PRACTITIONER

## 2022-01-19 PROCEDURE — 99999 PR PBB SHADOW E&M-EST. PATIENT-LVL IV: CPT | Mod: PBBFAC,,, | Performed by: NURSE PRACTITIONER

## 2022-01-19 PROCEDURE — 99999 PR PBB SHADOW E&M-EST. PATIENT-LVL IV: ICD-10-PCS | Mod: PBBFAC,,, | Performed by: NURSE PRACTITIONER

## 2022-01-19 RX ORDER — NEBIVOLOL 10 MG/1
10 TABLET ORAL DAILY
Qty: 30 TABLET | Refills: 0 | Status: SHIPPED | OUTPATIENT
Start: 2022-01-19 | End: 2022-02-23 | Stop reason: SDUPTHER

## 2022-01-19 RX ORDER — AMLODIPINE AND VALSARTAN 10; 320 MG/1; MG/1
1 TABLET ORAL DAILY
Qty: 30 TABLET | Refills: 0 | Status: SHIPPED | OUTPATIENT
Start: 2022-01-19 | End: 2022-02-23 | Stop reason: SDUPTHER

## 2022-01-19 NOTE — PROGRESS NOTES
"Subjective:       Patient ID: Salvador Pierce Jr. is a 46 y.o. male.    Chief Complaint: Hypertension (F/U)    HPI    Patient is a 46-year-old black male with hypertension, systolic murmur, mild anemia noted on wellness exam June 2018, an an abnormal TSH/subclinical hyperthyroidism noted on wellness exam in June 2018 that resolved and had normal thyroid panel that is here today for blood pressure check/HTN follow up.     Patient has Hypertension that is currently taking Amlodipine/Valsartan 10/320 mg daily. Patient has intolerance of Lisinopril due to complaint of Sexual Dysfunction/ED. Blood pressure remains elevated.  BP (!) 142/90   Pulse 87   Temp 98.2 °F (36.8 °C) (Temporal)   Resp 18   Ht 5' 11" (1.803 m)   Wt 93 kg (205 lb 0.4 oz)   SpO2 98%   BMI 28.60 kg/m²     Patient has a very mild grade 1/6 systolic murmur noted as a child and had evaluated by cardiologist in the past without any acute abnormalities.     Patient had history of a mild iron deficiency anemia in past that had improved with multivitamin.       Patient had an abnormal TSH on wellness labs in June 2018.  His TSH was mildly low at 0.145 but his free T4 was within normal limits and patient denied any hyperthyroid symptoms at the time. Subclinical Hyperthyroidsim being monitored.  In July 2019,  Patient had full thyroid panel and thyroid ultrasound for evaluation.  TSH remained low at 0.116 but rest of T3 and T4 labs are okay.  His thyroid antibodies were also NEGATIVE and his thyroid ultrasound showed no significant abnormality so no further workup is needed unless he becomes symptomatic.  Monitoring thyroid level yearly now.     Patient is 46 and due for colon cancer screening - patient elects colonoscopy at [place] [facility] put in.  Advised patient that someone will contact him to schedule.      Current Outpatient Medications   Medication Sig Dispense Refill    ibuprofen (ADVIL,MOTRIN) 600 MG tablet Take 1 tablet (600 mg total) by " mouth every 8 (eight) hours for 4 days then as needed. 24 tablet 0    multivitamin capsule Take 1 capsule by mouth once daily.      valACYclovir (VALTREX) 1000 MG tablet Take 1 tablet (1,000 mg total) by mouth every 12 (twelve) hours. 30 tablet 1    amlodipine-valsartan (EXFORGE)  mg per tablet Take 1 tablet by mouth once daily. 30 tablet 0    nebivoloL (BYSTOLIC) 10 MG Tab Take 1 tablet (10 mg total) by mouth once daily. 30 tablet 0     No current facility-administered medications for this visit.       Past Medical History:   Diagnosis Date    Abnormal TSH 6/20/2018    Patient had a mildly low TSH in June 2018 but normal free T4 and asymtomatic.  Will continue to monitor levels    Hypertension     Mild anemia 6/20/2018    noted on wellness - started on multivitamin    Subclinical hyperthyroidism 10/19/2018    TSH remains low at 0.116 but rest of T3 and T4 labs are okay.  His thyroid antibodies are also negative and his thyroid ultrasound showed no significant abnormality so no further workup is needed unless he becomes symptomatic.  Will monitor thyroid level yearly now.     Systolic murmur     reports noted as a child - had evaluated by cardiologist in the past       Past Surgical History:   Procedure Laterality Date    HERNIA REPAIR      right inguinal hernia at age 12       Family History   Problem Relation Age of Onset    Hypertension Mother     Multiple sclerosis Mother     Diabetes Father     Hypertension Father     Kidney disease Father     No Known Problems Brother        Social History     Socioeconomic History    Marital status:    Occupational History    Occupation: supervisory position   Tobacco Use    Smoking status: Former Smoker     Packs/day: 0.50     Years: 21.00     Pack years: 10.50     Types: Cigars, Cigarettes     Quit date: 2/17/2018     Years since quitting: 3.9    Smokeless tobacco: Never Used   Substance and Sexual Activity    Alcohol use: Yes      Alcohol/week: 4.0 standard drinks     Types: 4 Cans of beer per week     Comment: every other weekend - 3 mixed drinks per occasion    Drug use: No    Sexual activity: Yes     Partners: Female     Birth control/protection: I.U.D.     Comment: My wife has IUD     Social Determinants of Health     Financial Resource Strain: Low Risk     Difficulty of Paying Living Expenses: Not hard at all   Food Insecurity: No Food Insecurity    Worried About Running Out of Food in the Last Year: Never true    Ran Out of Food in the Last Year: Never true   Transportation Needs: No Transportation Needs    Lack of Transportation (Medical): No    Lack of Transportation (Non-Medical): No   Physical Activity: Insufficiently Active    Days of Exercise per Week: 2 days    Minutes of Exercise per Session: 30 min   Stress: No Stress Concern Present    Feeling of Stress : Only a little   Social Connections: Unknown    Frequency of Communication with Friends and Family: Once a week    Frequency of Social Gatherings with Friends and Family: Twice a week    Active Member of Clubs or Organizations: No    Attends Club or Organization Meetings: Patient refused    Marital Status:    Housing Stability: Low Risk     Unable to Pay for Housing in the Last Year: No    Number of Places Lived in the Last Year: 1    Unstable Housing in the Last Year: No       Review of Systems   Constitutional: Negative for activity change, appetite change, fatigue, fever and unexpected weight change.   HENT: Negative for congestion, ear pain, mouth sores, nosebleeds, postnasal drip, rhinorrhea, sinus pressure, sneezing, sore throat, trouble swallowing and voice change.    Eyes: Negative.    Respiratory: Negative for cough, chest tightness and shortness of breath.    Cardiovascular: Negative for chest pain, palpitations and leg swelling.   Gastrointestinal: Negative.  Negative for abdominal pain, blood in stool, constipation, diarrhea, nausea and  "vomiting.   Endocrine: Negative.    Genitourinary: Negative for difficulty urinating, dysuria, flank pain, hematuria and urgency.   Musculoskeletal: Negative for arthralgias, back pain, gait problem, joint swelling, myalgias and neck pain.   Skin: Negative for color change, rash and wound.   Allergic/Immunologic: Negative for immunocompromised state.   Neurological: Negative for dizziness, tremors, seizures, syncope, speech difficulty and headaches.   Hematological: Negative for adenopathy. Does not bruise/bleed easily.   Psychiatric/Behavioral: Negative for behavioral problems, dysphoric mood, sleep disturbance and suicidal ideas. The patient is not nervous/anxious.          Objective:     Vitals:    01/19/22 0910   BP: (!) 142/90   Pulse: 87   Resp: 18   Temp: 98.2 °F (36.8 °C)   TempSrc: Temporal   SpO2: 98%   Weight: 93 kg (205 lb 0.4 oz)   Height: 5' 11" (1.803 m)          Physical Exam  Constitutional:       General: He is not in acute distress.     Appearance: He is well-developed. He is not ill-appearing, toxic-appearing or diaphoretic.      Comments: Body mass index is 28.6 kg/m².         HENT:      Head: Normocephalic and atraumatic.      Right Ear: Tympanic membrane and external ear normal.      Left Ear: Tympanic membrane and external ear normal.      Nose: Nose normal.   Eyes:      General: No scleral icterus.        Right eye: No discharge.         Left eye: No discharge.      Extraocular Movements: Extraocular movements intact.      Conjunctiva/sclera: Conjunctivae normal.   Neck:      Thyroid: No thyroid mass or thyromegaly.      Vascular: No JVD.      Trachea: No tracheal deviation.   Cardiovascular:      Rate and Rhythm: Normal rate and regular rhythm.      Heart sounds: Murmur heard.    Systolic murmur is present with a grade of 1/6.       Comments: Grade 1/6 very mild Murmur to right sternal border  Pulmonary:      Effort: Pulmonary effort is normal. No respiratory distress.      Breath sounds: " Normal breath sounds. No stridor. No wheezing, rhonchi or rales.   Abdominal:      General: There is no distension.      Palpations: Abdomen is soft. There is no mass.      Tenderness: There is no abdominal tenderness. There is no guarding or rebound.      Hernia: No hernia is present.   Musculoskeletal:         General: No swelling or deformity. Normal range of motion.      Cervical back: Normal range of motion and neck supple.      Right lower leg: No edema.      Left lower leg: No edema.   Lymphadenopathy:      Cervical: No cervical adenopathy.   Skin:     General: Skin is warm and dry.      Findings: No rash.   Neurological:      Mental Status: He is alert and oriented to person, place, and time.      Coordination: Coordination normal.   Psychiatric:         Mood and Affect: Mood normal.         Behavior: Behavior normal.         Thought Content: Thought content normal.         Judgment: Judgment normal.           Assessment:         ICD-10-CM ICD-9-CM   1. Essential hypertension  I10 401.9   2. Subclinical hyperthyroidism  E05.90 242.90   3. Screening cholesterol level  Z13.220 V77.91   4. Colon cancer screening  Z12.11 V76.51   5. Diabetes mellitus screening  Z13.1 V77.1   6. Encounter for blood test for routine general physical examination  Z00.00 V72.62   7. Encounter for hepatitis C screening test for low risk patient  Z11.59 V73.89   8. Screening for deficiency anemia  Z13.0 V78.1   9. History of anemia  Z86.2 V12.3       Plan:       Essential hypertension  -  START Bystolic 10 mg daily  -  Continue amlodipine/valsartan present dose  -  Recheck in 4 weeks.  -     nebivoloL (BYSTOLIC) 10 MG Tab; Take 1 tablet (10 mg total) by mouth once daily.  Dispense: 30 tablet; Refill: 0  -     amlodipine-valsartan (EXFORGE)  mg per tablet; Take 1 tablet by mouth once daily.  Dispense: 30 tablet; Refill: 0    Subclinical hyperthyroidism\  -  Due for yearly thyroid check  -     TSH; Future; Expected date:  01/19/2022    Screening cholesterol level  -     Lipid Panel; Future; Expected date: 01/19/2022    Colon cancer screening  -  Ordered for [facility]  -     Case Request Endoscopy: COLONOSCOPY    Diabetes mellitus screening  -     Comprehensive Metabolic Panel; Future; Expected date: 01/19/2022    Encounter for blood test for routine general physical examination  -     CBC Auto Differential; Future; Expected date: 01/19/2022  -     Comprehensive Metabolic Panel; Future; Expected date: 01/19/2022  -     Lipid Panel; Future; Expected date: 01/19/2022  -     TSH; Future; Expected date: 01/19/2022  -     Hepatitis C Antibody; Future; Expected date: 01/19/2022    Encounter for hepatitis C screening test for low risk patient  -     Hepatitis C Antibody; Future; Expected date: 01/19/2022    Screening for deficiency anemia  -     CBC Auto Differential; Future; Expected date: 01/19/2022    History of anemia  -     CBC Auto Differential; Future; Expected date: 01/19/2022      Follow up in about 4 weeks (around 2/16/2022) for fasting labs and WELLNESS EXAM.     Patient's Medications   New Prescriptions    NEBIVOLOL (BYSTOLIC) 10 MG TAB    Take 1 tablet (10 mg total) by mouth once daily.   Previous Medications    IBUPROFEN (ADVIL,MOTRIN) 600 MG TABLET    Take 1 tablet (600 mg total) by mouth every 8 (eight) hours for 4 days then as needed.    MULTIVITAMIN CAPSULE    Take 1 capsule by mouth once daily.    VALACYCLOVIR (VALTREX) 1000 MG TABLET    Take 1 tablet (1,000 mg total) by mouth every 12 (twelve) hours.   Modified Medications    Modified Medication Previous Medication    AMLODIPINE-VALSARTAN (EXFORGE)  MG PER TABLET amlodipine-valsartan (EXFORGE)  mg per tablet       Take 1 tablet by mouth once daily.    Take 1 tablet by mouth once daily.   Discontinued Medications    AMOXICILLIN (AMOXIL) 500 MG CAPSULE    Take 1 capsule (500 mg total) by mouth every 8 (eight) hours begininng the day before appointment.     HYDROCODONE-ACETAMINOPHEN (NORCO) 5-325 MG PER TABLET    Take 1 tablet by mouth every 6 (six) hours as needed for pain.

## 2022-01-19 NOTE — PATIENT INSTRUCTIONS
"Patient Education       Controlling Your Blood Pressure Through Lifestyle   The Basics   Written by the doctors and editors at [facility]   What does my lifestyle have to do with my blood pressure? -- The things you do and the foods you eat have a big effect on your blood pressure and your overall health. Following the right lifestyle can:  · Lower your blood pressure or keep you from getting high blood pressure in the first place  · Reduce your need for blood pressure medicines  · Make medicines for high blood pressure work better, if you do take them  · Lower the chances that you'll have a heart attack or stroke, or develop kidney disease  Which lifestyle choices will help lower my blood pressure? -- Here's what you can do:  · Lose weight (if you are overweight)  · Choose a diet rich in fruits, vegetables, and low-fat dairy products, and low in meats, sweets, and refined grains  · Eat less salt (sodium)  · Do something active for at least 30 minutes a day on most days of the week  · Limit the amount of alcohol you drink  If you have high blood pressure, it's also very important to quit smoking (if you smoke). Quitting smoking might not bring your blood pressure down. But it will lower the chances that you'll have a heart attack or stroke, and it will help you feel better and live longer.  Start low and go slow -- The changes listed above might sound like a lot, but don't worry. You don't have to change everything all at once. The key to improving your lifestyle is to "start low and go slow." Choose 1 small, specific thing to change and try doing it for a while. If it works for you, keep doing it until it becomes a habit. If it doesn't, don't give up. Choose something else to change and see how that goes.  Let's say, for example, that you would like to improve your diet. If you're the type of person who eats cheeseburgers and French fries all the time, you can't switch to eating just salads from one day to the next. " "When people try to make changes like that, they often fail. Then they feel frustrated and tend to give up. So instead of trying to change everything about your diet in 1 day, change 1 or 2 small things about your diet and give yourself time to get used to those changes. For instance, keep the cheeseburger but give up the French fries. Or eat the same things but cut your portions in half.  As you find things that you are able to change and stick with, keep adding new changes. In time, you will see that you can actually change a lot. You just have to get used to the changes slowly.  Lose weight -- When people think about losing weight, they sometimes make it more complicated than it really is. To lose weight, you have to either eat less or move more. If you do both of those things, it's even better. But there is no single weight-loss diet or activity that's better than any other. When it comes to weight loss, the most effective plan is the one that you'll stick with.  Improve your diet -- There is no single diet that is right for everyone. But in general, a healthy diet can include:  · Lots of fruits, vegetables, and whole grains  · Some beans, peas, lentils, chickpeas, and similar foods  · Some nuts, such as walnuts, almonds, and peanuts  · Fat-free or low-fat milk and milk products  · Some fish  To have a healthy diet, it's also important to limit or avoid sugar, sweets, meats, and refined grains. (Refined grains are found in white bread, white rice, most forms of pasta, and most packaged "snack" foods.)  Reduce salt -- Many people think that eating a low-sodium diet means avoiding the salt shaker and not adding salt when cooking. The truth is, not adding salt at the table or when you cook will only help a little. Almost all of the sodium you eat is already in the food you buy at the grocery store or at restaurants (figure 1).  The most important thing you can do to cut down on sodium is to eat less processed food. " "That means that you should avoid most foods that are sold in cans, boxes, jars, and bags. You should also eat in restaurants less often.  To reduce the amount of sodium you get, buy fresh or fresh-frozen fruits, vegetables, and meats. (Fresh-frozen foods have had nothing added to them before freezing.) Then you can make meals at home, from scratch, with these ingredients.  As with the other changes, don't try to cut out salt all at once. Instead, choose 1 or 2 foods that have a lot of sodium and try to replace them with low-sodium choices. When you get used to those low-sodium options, find another food or 2 to change. Then keep going, until all the foods you eat are sodium-free or low in sodium.  Become more active -- If you want to be more active, you don't have to go to the gym or get all sweaty. It is possible to increase your activity level while doing everyday things you enjoy. Walking, gardening, and dancing are just a few of the things that you might try. As with all the other changes, the key is not to do too much too fast. If you don't do any activity now, start by walking for just a few minutes every other day. Do that for a few weeks. If you stick with it, try doing it for longer. But if you find that you don't like walking, try a different activity.  Drink less alcohol -- If you are a woman, do not have more than 1 "standard drink" of alcohol a day. If you are a man, do not have more than 2. A "standard drink" is:  · A can or bottle that has 12 ounces of beer  · A glass that has 5 ounces of wine  · A shot that has 1.5 ounces of whiskey  Where should I start? -- If you want to improve your lifestyle, start by making the changes that you think would be easiest for you. If you used to exercise and just got out of the habit, maybe it would be easy for you to start exercising again. Or if you actually like cooking meals from scratch, maybe the first thing you should focus on is eating home-cooked meals that " "are low in sodium.  Whatever you tackle first, choose specific, realistic goals, and give yourself a deadline. For example, do not decide that you are going to "exercise more." Instead, decide that you are going to walk for 10 minutes on Monday, Wednesday, and Friday, and that you are going to do this for the next 2 weeks.  When lifestyle changes are too general, people have a hard time following through.  Now go. You can do it!  All topics are updated as new evidence becomes available and our peer review process is complete.  This topic retrieved from [employer] on: Sep 21, 2021.  Topic 84269 Version 8.0  Release: 29.4.2 - C29.263  © 2021 UpToDate, Inc. and/or its affiliates. All rights reserved.  figure 1: Sources of sodium in your diet     Graphic 09294 Version 2.0    Consumer Information Use and Disclaimer   This information is not specific medical advice and does not replace information you receive from your health care provider. This is only a brief summary of general information. It does NOT include all information about conditions, illnesses, injuries, tests, procedures, treatments, therapies, discharge instructions or life-style choices that may apply to you. You must talk with your health care provider for complete information about your health and treatment options. This information should not be used to decide whether or not to accept your health care provider's advice, instructions or recommendations. Only your health care provider has the knowledge and training to provide advice that is right for you. The use of this information is governed by the [employer] End User License Agreement, available at [email or web address].The use of [employer] content is governed by the [employer] Terms of Use. ©2021 UpToDate, Inc. All rights reserved.  Copyright   © 2021 UpToDate, Inc. and/or its affiliates. All rights reserved.    "

## 2022-02-07 ENCOUNTER — PATIENT MESSAGE (OUTPATIENT)
Dept: FAMILY MEDICINE | Facility: CLINIC | Age: 47
End: 2022-02-07
Payer: COMMERCIAL

## 2022-02-07 DIAGNOSIS — Z12.11 COLON CANCER SCREENING: Primary | ICD-10-CM

## 2022-02-08 ENCOUNTER — TELEPHONE (OUTPATIENT)
Dept: ENDOSCOPY | Facility: HOSPITAL | Age: 47
End: 2022-02-08
Payer: COMMERCIAL

## 2022-02-08 DIAGNOSIS — Z01.818 PRE-OP TESTING: ICD-10-CM

## 2022-02-08 RX ORDER — POLYETHYLENE GLYCOL 3350, SODIUM SULFATE ANHYDROUS, SODIUM BICARBONATE, SODIUM CHLORIDE, POTASSIUM CHLORIDE 236; 22.74; 6.74; 5.86; 2.97 G/4L; G/4L; G/4L; G/4L; G/4L
POWDER, FOR SOLUTION ORAL
Qty: 4000 ML | Refills: 0 | Status: SHIPPED | OUTPATIENT
Start: 2022-02-08 | End: 2022-04-20 | Stop reason: ALTCHOICE

## 2022-02-08 NOTE — TELEPHONE ENCOUNTER
Endoscopy Scheduling Questionnaire:         1. Are you taking any blood thinners? N               If Yes  Have you been on them for longer than one year?     2. Have you been diagnosed with Diverticulitis in past three months?  N    3. Are you on dialysis or have Kidney Disease? N    4. Previous Colonoscopy?  N         If yes Do you have a history of colon polyps?        6. Are you a diabetic?  N    7. Do you have a history of constipation?  N      Procedure scheduled with Dr. Ferrari on  3/17/2022    The prep being used is Golytely    The patient's prep instructions were sent by [employer]

## 2022-02-10 ENCOUNTER — PATIENT OUTREACH (OUTPATIENT)
Dept: ADMINISTRATIVE | Facility: HOSPITAL | Age: 47
End: 2022-02-10
Payer: COMMERCIAL

## 2022-02-22 DIAGNOSIS — I10 ESSENTIAL HYPERTENSION: ICD-10-CM

## 2022-02-22 RX ORDER — NEBIVOLOL 10 MG/1
10 TABLET ORAL DAILY
Qty: 30 TABLET | Refills: 0 | OUTPATIENT
Start: 2022-02-22 | End: 2023-02-22

## 2022-02-22 RX ORDER — AMLODIPINE AND VALSARTAN 10; 320 MG/1; MG/1
1 TABLET ORAL DAILY
Qty: 30 TABLET | Refills: 0 | OUTPATIENT
Start: 2022-02-22 | End: 2023-02-22

## 2022-02-23 DIAGNOSIS — I10 ESSENTIAL HYPERTENSION: ICD-10-CM

## 2022-02-23 RX ORDER — NEBIVOLOL 10 MG/1
10 TABLET ORAL DAILY
Qty: 30 TABLET | Refills: 0 | Status: SHIPPED | OUTPATIENT
Start: 2022-02-23 | End: 2022-03-28 | Stop reason: SDUPTHER

## 2022-02-23 RX ORDER — AMLODIPINE AND VALSARTAN 10; 320 MG/1; MG/1
1 TABLET ORAL DAILY
Qty: 30 TABLET | Refills: 0 | Status: SHIPPED | OUTPATIENT
Start: 2022-02-23 | End: 2022-03-28 | Stop reason: SDUPTHER

## 2022-03-11 ENCOUNTER — TELEPHONE (OUTPATIENT)
Dept: ENDOSCOPY | Facility: HOSPITAL | Age: 47
End: 2022-03-11
Payer: COMMERCIAL

## 2022-03-17 PROBLEM — K63.5 COLON POLYP: Status: ACTIVE | Noted: 2022-03-17

## 2022-03-17 PROBLEM — Z12.11 SCREENING FOR COLON CANCER: Status: ACTIVE | Noted: 2022-03-17

## 2022-03-28 ENCOUNTER — PATIENT MESSAGE (OUTPATIENT)
Dept: FAMILY MEDICINE | Facility: CLINIC | Age: 47
End: 2022-03-28
Payer: COMMERCIAL

## 2022-03-28 DIAGNOSIS — I10 ESSENTIAL HYPERTENSION: ICD-10-CM

## 2022-03-28 RX ORDER — NEBIVOLOL 10 MG/1
10 TABLET ORAL DAILY
Qty: 30 TABLET | Refills: 0 | Status: SHIPPED | OUTPATIENT
Start: 2022-03-28 | End: 2022-05-08 | Stop reason: SDUPTHER

## 2022-03-28 RX ORDER — AMLODIPINE AND VALSARTAN 10; 320 MG/1; MG/1
1 TABLET ORAL DAILY
Qty: 30 TABLET | Refills: 0 | Status: SHIPPED | OUTPATIENT
Start: 2022-03-28 | End: 2022-05-08 | Stop reason: SDUPTHER

## 2022-03-29 ENCOUNTER — PATIENT MESSAGE (OUTPATIENT)
Dept: FAMILY MEDICINE | Facility: CLINIC | Age: 47
End: 2022-03-29
Payer: COMMERCIAL

## 2022-03-30 DIAGNOSIS — D13.91 POLYPOSIS COLI: Primary | ICD-10-CM

## 2022-04-20 ENCOUNTER — OFFICE VISIT (OUTPATIENT)
Dept: FAMILY MEDICINE | Facility: CLINIC | Age: 47
End: 2022-04-20
Payer: COMMERCIAL

## 2022-04-20 VITALS
HEIGHT: 72 IN | DIASTOLIC BLOOD PRESSURE: 78 MMHG | HEART RATE: 52 BPM | SYSTOLIC BLOOD PRESSURE: 132 MMHG | OXYGEN SATURATION: 96 % | BODY MASS INDEX: 26.56 KG/M2 | WEIGHT: 196.13 LBS | TEMPERATURE: 98 F

## 2022-04-20 DIAGNOSIS — R74.01 ELEVATED AST (SGOT): ICD-10-CM

## 2022-04-20 DIAGNOSIS — Z86.010 HISTORY OF COLON POLYPS: ICD-10-CM

## 2022-04-20 DIAGNOSIS — Z13.1 DIABETES MELLITUS SCREENING: ICD-10-CM

## 2022-04-20 DIAGNOSIS — I10 ESSENTIAL HYPERTENSION: ICD-10-CM

## 2022-04-20 DIAGNOSIS — Z00.00 ANNUAL PHYSICAL EXAM: Primary | ICD-10-CM

## 2022-04-20 DIAGNOSIS — E05.90 SUBCLINICAL HYPERTHYROIDISM: ICD-10-CM

## 2022-04-20 DIAGNOSIS — R01.1 SYSTOLIC MURMUR: ICD-10-CM

## 2022-04-20 DIAGNOSIS — E61.1 IRON DEFICIENCY: ICD-10-CM

## 2022-04-20 DIAGNOSIS — R73.03 PREDIABETES: ICD-10-CM

## 2022-04-20 DIAGNOSIS — Z86.2 HISTORY OF ANEMIA: ICD-10-CM

## 2022-04-20 PROCEDURE — 3008F BODY MASS INDEX DOCD: CPT | Mod: CPTII,S$GLB,, | Performed by: NURSE PRACTITIONER

## 2022-04-20 PROCEDURE — 99396 PREV VISIT EST AGE 40-64: CPT | Mod: S$GLB,,, | Performed by: NURSE PRACTITIONER

## 2022-04-20 PROCEDURE — 99999 PR PBB SHADOW E&M-EST. PATIENT-LVL III: CPT | Mod: PBBFAC,,, | Performed by: NURSE PRACTITIONER

## 2022-04-20 PROCEDURE — 3075F SYST BP GE 130 - 139MM HG: CPT | Mod: CPTII,S$GLB,, | Performed by: NURSE PRACTITIONER

## 2022-04-20 PROCEDURE — 1159F PR MEDICATION LIST DOCUMENTED IN MEDICAL RECORD: ICD-10-PCS | Mod: CPTII,S$GLB,, | Performed by: NURSE PRACTITIONER

## 2022-04-20 PROCEDURE — 4010F PR ACE/ARB THEARPY RXD/TAKEN: ICD-10-PCS | Mod: CPTII,S$GLB,, | Performed by: NURSE PRACTITIONER

## 2022-04-20 PROCEDURE — 4010F ACE/ARB THERAPY RXD/TAKEN: CPT | Mod: CPTII,S$GLB,, | Performed by: NURSE PRACTITIONER

## 2022-04-20 PROCEDURE — 99396 PR PREVENTIVE VISIT,EST,40-64: ICD-10-PCS | Mod: S$GLB,,, | Performed by: NURSE PRACTITIONER

## 2022-04-20 PROCEDURE — 3078F DIAST BP <80 MM HG: CPT | Mod: CPTII,S$GLB,, | Performed by: NURSE PRACTITIONER

## 2022-04-20 PROCEDURE — 1160F RVW MEDS BY RX/DR IN RCRD: CPT | Mod: CPTII,S$GLB,, | Performed by: NURSE PRACTITIONER

## 2022-04-20 PROCEDURE — 99999 PR PBB SHADOW E&M-EST. PATIENT-LVL III: ICD-10-PCS | Mod: PBBFAC,,, | Performed by: NURSE PRACTITIONER

## 2022-04-20 PROCEDURE — 3075F PR MOST RECENT SYSTOLIC BLOOD PRESS GE 130-139MM HG: ICD-10-PCS | Mod: CPTII,S$GLB,, | Performed by: NURSE PRACTITIONER

## 2022-04-20 PROCEDURE — 3008F PR BODY MASS INDEX (BMI) DOCUMENTED: ICD-10-PCS | Mod: CPTII,S$GLB,, | Performed by: NURSE PRACTITIONER

## 2022-04-20 PROCEDURE — 3078F PR MOST RECENT DIASTOLIC BLOOD PRESSURE < 80 MM HG: ICD-10-PCS | Mod: CPTII,S$GLB,, | Performed by: NURSE PRACTITIONER

## 2022-04-20 PROCEDURE — 1160F PR REVIEW ALL MEDS BY PRESCRIBER/CLIN PHARMACIST DOCUMENTED: ICD-10-PCS | Mod: CPTII,S$GLB,, | Performed by: NURSE PRACTITIONER

## 2022-04-20 PROCEDURE — 1159F MED LIST DOCD IN RCRD: CPT | Mod: CPTII,S$GLB,, | Performed by: NURSE PRACTITIONER

## 2022-04-20 NOTE — PROGRESS NOTES
Subjective:       Patient ID: Salvador Pierce Jr. is a 47 y.o. male.    Chief Complaint: Wellness (Pt here for wellness visit/ )    HPI    Patient is a 47-year-old black male with hypertension, systolic murmur, mild anemia noted on wellness exam June 2018, an abnormal TSH/subclinical hyperthyroidism noted on wellness exam in June 2018 that had normal thyroid panel with negative antibodies, and history of multiple colon polyps that is here today for ANNUAL physical exam - he will go today for screening fasting labs.     Hypertension  currently taking Amlodipine/Valsartan 10/320 mg daily and Bystolic 10 mg daily  Intolerance of Lisinopril due to complaint of Sexual Dysfunction/ED  /78   Pulse (!) 52   Temp 98.4 °F (36.9 °C) (Temporal)   Ht 6' (1.829 m)   Wt 89 kg (196 lb 1.6 oz)   SpO2 96%   BMI 26.60 kg/m²       Very mild grade 1/6 systolic murmur   noted as a child and had evaluated by cardiologist in the past without any acute abnormalities.     History of a mild iron deficiency anemia   had improved on MVI with iron before colonoscopy but again anemic last month after colonoscopy - will recheck CBC and iron levels today.     Subclinical Hyperthyroidism/Abnormal TSH  Patient had an abnormal TSH on wellness labs in June 2018.  His TSH was mildly low at 0.145 but his free T4 was within normal limits and patient denied any hyperthyroid symptoms at the time. Subclinical Hyperthyroidsim being monitored.  In July 2019,  Patient had full thyroid panel and thyroid ultrasound for evaluation.  TSH remained low at 0.116 but rest of T3 and T4 labs are okay.  His thyroid antibodies were also NEGATIVE and his thyroid ultrasound showed no significant abnormality so no further workup is needed unless he becomes symptomatic.  Monitoring thyroid level yearly now - due to check today.     History of multiple colon polyps  Colonoscopy done 3/17/2022 Dr. Ferrari CRS  Due to repeat in 6 months - September 2022    Health  Maintenance:  Fasting labs today  Colonoscopy due in September 2022  Hepatitis C screening and HgbA1C screening will be drawn today      Review of Systems   Constitutional: Negative for activity change, appetite change, fatigue, fever and unexpected weight change.   HENT: Negative for congestion, ear pain, mouth sores, nosebleeds, postnasal drip, rhinorrhea, sinus pressure, sneezing, sore throat, trouble swallowing and voice change.    Eyes: Negative.    Respiratory: Negative for cough, chest tightness and shortness of breath.    Cardiovascular: Negative for chest pain, palpitations and leg swelling.   Gastrointestinal: Negative.  Negative for abdominal pain, blood in stool, constipation, diarrhea, nausea and vomiting.   Endocrine: Negative.    Genitourinary: Negative for difficulty urinating, dysuria, flank pain, hematuria and urgency.   Musculoskeletal: Negative for arthralgias, back pain, gait problem, joint swelling, myalgias and neck pain.   Skin: Negative for color change, rash and wound.   Allergic/Immunologic: Negative for immunocompromised state.   Neurological: Negative for dizziness, tremors, seizures, syncope, speech difficulty and headaches.   Hematological: Negative for adenopathy. Does not bruise/bleed easily.   Psychiatric/Behavioral: Negative for behavioral problems, dysphoric mood, sleep disturbance and suicidal ideas. The patient is not nervous/anxious.          Objective:     Vitals:    04/20/22 0901   BP: 132/78   Pulse: (!) 52   Temp: 98.4 °F (36.9 °C)   TempSrc: Temporal   SpO2: 96%   Weight: 89 kg (196 lb 1.6 oz)   Height: 6' (1.829 m)          Physical Exam  Constitutional:       General: He is not in acute distress.     Appearance: Normal appearance. He is well-developed. He is not ill-appearing, toxic-appearing or diaphoretic.      Comments: Body mass index is 26.6 kg/m².     HENT:      Head: Normocephalic and atraumatic.      Right Ear: Tympanic membrane and external ear normal.      Left  Ear: Tympanic membrane and external ear normal.      Nose: Nose normal.   Eyes:      General: No scleral icterus.        Right eye: No discharge.         Left eye: No discharge.      Extraocular Movements: Extraocular movements intact.      Conjunctiva/sclera: Conjunctivae normal.   Neck:      Thyroid: No thyroid mass or thyromegaly.      Vascular: No JVD.      Trachea: No tracheal deviation.   Cardiovascular:      Rate and Rhythm: Normal rate and regular rhythm.      Heart sounds: Murmur heard.    Systolic murmur is present with a grade of 1/6.     Comments: Grade 1/6 very mild Murmur to right sternal border  Pulmonary:      Effort: Pulmonary effort is normal. No respiratory distress.      Breath sounds: Normal breath sounds. No stridor. No wheezing, rhonchi or rales.   Abdominal:      General: There is no distension.      Palpations: Abdomen is soft. There is no mass.      Tenderness: There is no abdominal tenderness. There is no guarding or rebound.      Hernia: No hernia is present.   Musculoskeletal:         General: No swelling or deformity. Normal range of motion.      Cervical back: Normal range of motion and neck supple.      Right lower leg: No edema.      Left lower leg: No edema.   Lymphadenopathy:      Cervical: No cervical adenopathy.   Skin:     General: Skin is warm and dry.      Findings: No rash.   Neurological:      Mental Status: He is alert and oriented to person, place, and time.      Coordination: Coordination normal.   Psychiatric:         Mood and Affect: Mood normal.         Behavior: Behavior normal.         Thought Content: Thought content normal.         Judgment: Judgment normal.           Assessment:         ICD-10-CM ICD-9-CM   1. Annual physical exam  Z00.00 V70.0   2. Essential hypertension  I10 401.9   3. Systolic murmur  R01.1 785.2   4. Subclinical hyperthyroidism  E05.90 242.90   5. History of colon polyps  Z86.010 V12.72   6. History of anemia  Z86.2 V12.3   7. Diabetes  mellitus screening  Z13.1 V77.1       Plan:       Annual physical exam  -     Hemoglobin A1C; Future; Expected date: 04/20/2022  Fasting labs today  Colonoscopy due in September 2022  Hepatitis C screening and HgbA1C screening will be drawn today    Health Maintenance Summary     Full History      Expand All  Collapse All    Scheduled - Hepatitis C Screening  (Once)  Scheduled for 4/20/2022  No completion history exists for this topic.     Scheduled - Hemoglobin A1c  (Every 3 Years)  Scheduled for 4/20/2022  No completion history exists for this topic.     Scheduled - Lipid Panel  (Every 5 Years)  Scheduled for 4/20/2022 11/27/2020  Lipid Panel    06/17/2019  Lipid panel    06/20/2018  Lipid panel      TETANUS VACCINE  (Every 10 Years)  Next due on 10/3/2028  10/03/2018  Imm Admin: Tdap      Colorectal Cancer Screening  (Colonoscopy - Every 10 Years)  Next due on 3/17/2032  03/17/2022  Colonoscopy      HIV Screening  Completed  11/27/2020  HIV 1/2 Ag/Ab (4th Gen)      COVID-19 Vaccine  (Series Information)  Completed  10/14/2021  Imm Admin: COVID-19, MRNA, LN-S, PF (Pfizer) (Purple Cap)    04/11/2021  Imm Admin: COVID-19, MRNA, LN-S, PF (Pfizer) (Purple Cap)    03/20/2021  Imm Admin: COVID-19, MRNA, LN-S, PF (Pfizer) (Purple Cap)      Influenza Vaccine  (Series Information)  Completed  11/17/2021  Imm Admin: Influenza    11/30/2020  [employer]: WORKFLOW - HEALTHY PLANET - EXTERNAL DATA - EXTERNAL PROCEDURE DATE - INFLUENZA    10/26/2019  Imm Admin: Influenza    10/03/2018  Imm Admin: Influenza - Quadrivalent - PF *Preferred* (6 months and older)          Essential hypertension  Continue current medication(s).  Follow up in 6 months.    Systolic murmur  -  Stable and unchanged    Subclinical hyperthyroidism  -  Monitored yearly - TSH today    History of colon polyps  Repeat colonoscopy September 2022    History of anemia  -     Iron and TIBC; Future; Expected date: 04/20/2022    Diabetes mellitus screening  -      Hemoglobin A1C; Future; Expected date: 04/20/2022      ####ADDENDUM 4/26/2022:  Wellness labs Results:    IFG/Prediabetes   with HgbA1C 5.8%  Need to decrease sugars and carbs in diet and recheck in 6 months    Elevated AST  AST 63  Need to avoid all tylenol and alcohol and recheck in 6 months    Iron Deficiency Anemia  Saturated iron 12% - add on Ferrous Sulfate 325 mg daily in addition to multivitamin with iron and recheck level in 6 months.    Follow up in about 6 months (around 10/20/2022) for medication/blood pressure check; fasting labs today - will send results over patient portal..     Patient's Medications   New Prescriptions    No medications on file   Previous Medications    AMLODIPINE-VALSARTAN (EXFORGE)  MG PER TABLET    Take 1 tablet by mouth once daily.    MULTIVITAMIN CAPSULE    Take 1 capsule by mouth once daily.    NEBIVOLOL (BYSTOLIC) 10 MG TAB    Take 1 tablet (10 mg total) by mouth once daily.   Modified Medications    No medications on file   Discontinued Medications    IBUPROFEN (ADVIL,MOTRIN) 600 MG TABLET    Take 1 tablet (600 mg total) by mouth every 8 (eight) hours for 4 days then as needed.    POLYETHYLENE GLYCOL (GAVILYTE-G) 236-22.74-6.74 -5.86 GRAM SUSPENSION    Take 4,000 mLs by mouth once. for 1 dose    VALACYCLOVIR (VALTREX) 1000 MG TABLET    Take 1 tablet (1,000 mg total) by mouth every 12 (twelve) hours.       Past Medical History:   Diagnosis Date    Abnormal TSH 6/20/2018    Patient had a mildly low TSH in June 2018 but normal free T4 and asymtomatic.  Will continue to monitor levels    Colon polyp 3/17/2022    Hypertension     Mild anemia 6/20/2018    noted on wellness - started on multivitamin    Subclinical hyperthyroidism 10/19/2018    TSH remains low at 0.116 but rest of T3 and T4 labs are okay.  His thyroid antibodies are also negative and his thyroid ultrasound showed no significant abnormality so no further workup is needed unless he becomes  symptomatic.  Will monitor thyroid level yearly now.     Systolic murmur     reports noted as a child - had evaluated by cardiologist in the past       Past Surgical History:   Procedure Laterality Date    COLONOSCOPY N/A 3/17/2022    Procedure: COLONOSCOPY;  Surgeon: CARRI Ferrari MD;  [facility];  Service: Endoscopy;  Laterality: N/A;    HERNIA REPAIR      right inguinal hernia at age 12       Family History   Problem Relation Age of Onset    Hypertension Mother     Multiple sclerosis Mother     Diabetes Father     Hypertension Father     Kidney disease Father     No Known Problems Brother     Early death Neg Hx        Social History     Socioeconomic History    Marital status:    Occupational History    Occupation: supervisory position   Tobacco Use    Smoking status: Former Smoker     Packs/day: 0.50     Years: 21.00     Pack years: 10.50     Types: Cigars, Cigarettes     Quit date: 2018     Years since quittin.1    Smokeless tobacco: Never Used   Substance and Sexual Activity    Alcohol use: Yes     Alcohol/week: 2.0 standard drinks     Types: 2 Cans of beer per week     Comment: mostly on weekends -  either a couple a beers or a couple drinks    Drug use: No    Sexual activity: Yes     Partners: Female     Birth control/protection: I.U.D.     Comment: My wife has IUD     Social Determinants of Health     Financial Resource Strain: Low Risk     Difficulty of Paying Living Expenses: Not hard at all   Food Insecurity: No Food Insecurity    Worried About Running Out of Food in the Last Year: Never true    Ran Out of Food in the Last Year: Never true   Transportation Needs: No Transportation Needs    Lack of Transportation (Medical): No    Lack of Transportation (Non-Medical): No   Physical Activity: Insufficiently Active    Days of Exercise per Week: 2 days    Minutes of Exercise per Session: 30 min   Stress: No Stress Concern Present    Feeling of Stress :  Only a little   Social Connections: Unknown    Frequency of Communication with Friends and Family: Once a week    Frequency of Social Gatherings with Friends and Family: Twice a week    Active Member of Clubs or Organizations: No    Attends Club or Organization Meetings: Patient refused    Marital Status:    Housing Stability: Low Risk     Unable to Pay for Housing in the Last Year: No    Number of Places Lived in the Last Year: 1    Unstable Housing in the Last Year: No

## 2022-04-26 PROBLEM — E61.1 IRON DEFICIENCY: Status: ACTIVE | Noted: 2022-04-26

## 2022-04-26 PROBLEM — R73.03 PREDIABETES: Status: ACTIVE | Noted: 2022-04-26

## 2022-04-26 PROBLEM — R74.01 ELEVATED AST (SGOT): Status: ACTIVE | Noted: 2022-04-26

## 2022-05-08 DIAGNOSIS — I10 ESSENTIAL HYPERTENSION: ICD-10-CM

## 2022-05-09 RX ORDER — NEBIVOLOL 10 MG/1
10 TABLET ORAL DAILY
Qty: 30 TABLET | Refills: 5 | Status: SHIPPED | OUTPATIENT
Start: 2022-05-09 | End: 2022-12-19 | Stop reason: SDUPTHER

## 2022-05-09 RX ORDER — AMLODIPINE AND VALSARTAN 10; 320 MG/1; MG/1
1 TABLET ORAL DAILY
Qty: 30 TABLET | Refills: 5 | Status: SHIPPED | OUTPATIENT
Start: 2022-05-09 | End: 2022-12-19 | Stop reason: SDUPTHER

## 2022-07-20 ENCOUNTER — TELEPHONE (OUTPATIENT)
Dept: SURGERY | Facility: CLINIC | Age: 47
End: 2022-07-20
Payer: COMMERCIAL

## 2022-07-21 ENCOUNTER — TELEPHONE (OUTPATIENT)
Dept: ENDOSCOPY | Facility: HOSPITAL | Age: 47
End: 2022-07-21
Payer: COMMERCIAL

## 2022-07-21 ENCOUNTER — TELEPHONE (OUTPATIENT)
Dept: SURGERY | Facility: CLINIC | Age: 47
End: 2022-07-21
Payer: COMMERCIAL

## 2022-07-21 RX ORDER — POLYETHYLENE GLYCOL 3350, SODIUM SULFATE ANHYDROUS, SODIUM BICARBONATE, SODIUM CHLORIDE, POTASSIUM CHLORIDE 236; 22.74; 6.74; 5.86; 2.97 G/4L; G/4L; G/4L; G/4L; G/4L
4 POWDER, FOR SOLUTION ORAL ONCE
Qty: 4000 ML | Refills: 0 | Status: SHIPPED | OUTPATIENT
Start: 2022-07-21 | End: 2022-08-04

## 2022-07-21 NOTE — TELEPHONE ENCOUNTER
Spoke with patient and informed of plan to follow up with Dr. Ferrari at next available appointment for colonoscopy. Patient agreeable to plan and verbalizes understanding. Message sent to FADI Thorne RN to help facilitate scheduling colonoscopy.

## 2022-07-21 NOTE — TELEPHONE ENCOUNTER
Endoscopy Scheduling Questionnaire:         1. Are you taking any blood thinners? No               If Yes  Have you been on them for longer than one year?     2. Have you been diagnosed with Diverticulitis in past three months?  No    3. Are you on dialysis or have Kidney Disease? No    4. Previous Colonoscopy?  Yes         If yes Do you have a history of colon polyps?  Yes      6. Are you a diabetic?  No    7. Do you have a history of constipation?  No    8. On Adipex or Phenterimine No      Procedure scheduled with Dr. Ferrari on  09/01/2022    The prep being used is Golytely     The patient's prep instructions were sent by [employer]

## 2022-07-21 NOTE — TELEPHONE ENCOUNTER
----- Message from CARRI Ferrari MD sent at 7/20/2022 10:10 PM CDT -----  Howdy,     This gentleman is coming to see us at 1pm, but it looks like he just needs his followup scope, so he is welcome to bypass the visit if he would like and we can just do his scope next available.  If that works with him, can you let Candida know?    Thank you!    Keagan

## 2022-09-02 ENCOUNTER — OFFICE VISIT (OUTPATIENT)
Dept: UROLOGY | Facility: CLINIC | Age: 47
End: 2022-09-02
Payer: COMMERCIAL

## 2022-09-02 VITALS
OXYGEN SATURATION: 98 % | HEART RATE: 60 BPM | DIASTOLIC BLOOD PRESSURE: 78 MMHG | SYSTOLIC BLOOD PRESSURE: 112 MMHG | WEIGHT: 187 LBS | BODY MASS INDEX: 26.18 KG/M2 | HEIGHT: 71 IN

## 2022-09-02 DIAGNOSIS — Z30.09 STERILIZATION CONSULT: ICD-10-CM

## 2022-09-02 DIAGNOSIS — N52.8 OTHER MALE ERECTILE DYSFUNCTION: ICD-10-CM

## 2022-09-02 PROCEDURE — 3008F BODY MASS INDEX DOCD: CPT | Mod: CPTII,S$GLB,, | Performed by: UROLOGY

## 2022-09-02 PROCEDURE — 99999 PR PBB SHADOW E&M-EST. PATIENT-LVL IV: CPT | Mod: PBBFAC,,, | Performed by: UROLOGY

## 2022-09-02 PROCEDURE — 1159F MED LIST DOCD IN RCRD: CPT | Mod: CPTII,S$GLB,, | Performed by: UROLOGY

## 2022-09-02 PROCEDURE — 4010F PR ACE/ARB THEARPY RXD/TAKEN: ICD-10-PCS | Mod: CPTII,S$GLB,, | Performed by: UROLOGY

## 2022-09-02 PROCEDURE — 99205 PR OFFICE/OUTPT VISIT, NEW, LEVL V, 60-74 MIN: ICD-10-PCS | Mod: S$GLB,,, | Performed by: UROLOGY

## 2022-09-02 PROCEDURE — 3074F SYST BP LT 130 MM HG: CPT | Mod: CPTII,S$GLB,, | Performed by: UROLOGY

## 2022-09-02 PROCEDURE — 1159F PR MEDICATION LIST DOCUMENTED IN MEDICAL RECORD: ICD-10-PCS | Mod: CPTII,S$GLB,, | Performed by: UROLOGY

## 2022-09-02 PROCEDURE — 99205 OFFICE O/P NEW HI 60 MIN: CPT | Mod: S$GLB,,, | Performed by: UROLOGY

## 2022-09-02 PROCEDURE — 3078F PR MOST RECENT DIASTOLIC BLOOD PRESSURE < 80 MM HG: ICD-10-PCS | Mod: CPTII,S$GLB,, | Performed by: UROLOGY

## 2022-09-02 PROCEDURE — 3078F DIAST BP <80 MM HG: CPT | Mod: CPTII,S$GLB,, | Performed by: UROLOGY

## 2022-09-02 PROCEDURE — 3044F HG A1C LEVEL LT 7.0%: CPT | Mod: CPTII,S$GLB,, | Performed by: UROLOGY

## 2022-09-02 PROCEDURE — 3044F PR MOST RECENT HEMOGLOBIN A1C LEVEL <7.0%: ICD-10-PCS | Mod: CPTII,S$GLB,, | Performed by: UROLOGY

## 2022-09-02 PROCEDURE — 99999 PR PBB SHADOW E&M-EST. PATIENT-LVL IV: ICD-10-PCS | Mod: PBBFAC,,, | Performed by: UROLOGY

## 2022-09-02 PROCEDURE — 4010F ACE/ARB THERAPY RXD/TAKEN: CPT | Mod: CPTII,S$GLB,, | Performed by: UROLOGY

## 2022-09-02 PROCEDURE — 3008F PR BODY MASS INDEX (BMI) DOCUMENTED: ICD-10-PCS | Mod: CPTII,S$GLB,, | Performed by: UROLOGY

## 2022-09-02 PROCEDURE — 3074F PR MOST RECENT SYSTOLIC BLOOD PRESSURE < 130 MM HG: ICD-10-PCS | Mod: CPTII,S$GLB,, | Performed by: UROLOGY

## 2022-09-02 RX ORDER — CIPROFLOXACIN 500 MG/1
500 TABLET ORAL 2 TIMES DAILY
Qty: 10 TABLET | Refills: 0 | Status: SHIPPED | OUTPATIENT
Start: 2022-09-02 | End: 2022-09-07

## 2022-09-02 RX ORDER — DIAZEPAM 10 MG/1
10 TABLET ORAL
Qty: 1 TABLET | Refills: 0 | Status: SHIPPED | OUTPATIENT
Start: 2022-09-02 | End: 2022-12-20

## 2022-09-02 RX ORDER — TADALAFIL 10 MG/1
10 TABLET ORAL DAILY PRN
Qty: 15 TABLET | Refills: 11 | Status: SHIPPED | OUTPATIENT
Start: 2022-09-02 | End: 2023-10-29 | Stop reason: SDUPTHER

## 2022-09-02 RX ORDER — HYDROCODONE BITARTRATE AND ACETAMINOPHEN 5; 325 MG/1; MG/1
1 TABLET ORAL EVERY 6 HOURS PRN
Qty: 20 TABLET | Refills: 0 | Status: SHIPPED | OUTPATIENT
Start: 2022-09-02 | End: 2022-09-07

## 2022-09-02 NOTE — PROGRESS NOTES
Subjective:       Patient ID: Salvador Pierce Jr. is a 47 y.o. male.    Chief Complaint: Erectile Dysfunction    48 yo AAM presented to the office with a history of recent development of erectile dysfunction.  His primary care physician change his blood pressure medicine and this has improved however he feels like during activity of intercourse the erection we consider is not as strong or as forceful as it had been in the past.  Although it is better since he changed his medication.  Patient also is  with 4 children sent a grandchild and desires a vasectomy.  He is here for consultation for that.    Erectile Dysfunction  This is a new problem. The current episode started more than 1 year ago. The problem has been gradually worsening since onset. The nature of his difficulty is achieving erection. He reports no anxiety, decreased libido or performance anxiety. He reports his erection duration to be 5 to 10 minutes. Irritative symptoms do not include frequency, nocturia or urgency. Obstructive symptoms do not include dribbling, incomplete emptying, an intermittent stream, a slower stream, straining or a weak stream. Pertinent negatives include no chills, dysuria, genital pain, hematuria, hesitancy or inability to urinate. Nothing aggravates the symptoms. Treatments tried: Changed blood pressure medicine. The treatment provided moderate relief. He has been using treatment for 1 to 6 months. He has had no adverse reactions caused by medications. Risk factors include tobacco use and hypertension (Pre-diabetes).   Review of Systems   Constitutional:  Negative for activity change, appetite change, chills, diaphoresis, fatigue, fever and unexpected weight change.   HENT:  Negative for congestion, hearing loss, sinus pressure and trouble swallowing.    Eyes:  Negative for photophobia, pain, discharge and visual disturbance.   Respiratory:  Negative for apnea, cough and shortness of breath.    Cardiovascular:   Negative for chest pain, palpitations and leg swelling.   Gastrointestinal:  Negative for abdominal distention, abdominal pain, anal bleeding, blood in stool, constipation, diarrhea, nausea, rectal pain and vomiting.   Endocrine: Negative for cold intolerance, heat intolerance, polydipsia, polyphagia and polyuria.   Genitourinary:  Negative for decreased libido, decreased urine volume, difficulty urinating, dysuria, enuresis, flank pain, frequency, genital sores, hematuria, hesitancy, incomplete emptying, nocturia, penile discharge, penile pain, penile swelling, scrotal swelling, testicular pain and urgency.   Musculoskeletal:  Negative for arthralgias, back pain and myalgias.   Skin:  Negative for color change, pallor, rash and wound.   Allergic/Immunologic: Negative for environmental allergies, food allergies and immunocompromised state.   Neurological:  Negative for dizziness, seizures, weakness and headaches.   Hematological:  Negative for adenopathy. Does not bruise/bleed easily.   Psychiatric/Behavioral: Negative.  The patient is not nervous/anxious.      Objective:      Physical Exam  Vitals and nursing note reviewed.   Constitutional:       General: He is not in acute distress.     Appearance: Normal appearance. He is well-developed. He is not ill-appearing, toxic-appearing or diaphoretic.   HENT:      Head: Normocephalic and atraumatic.      Right Ear: External ear normal.      Left Ear: External ear normal.      Nose: Nose normal.      Mouth/Throat:      Mouth: Mucous membranes are moist.      Pharynx: Oropharynx is clear. No oropharyngeal exudate or posterior oropharyngeal erythema.   Eyes:      General: No scleral icterus.        Right eye: No discharge.         Left eye: No discharge.      Conjunctiva/sclera: Conjunctivae normal.      Pupils: Pupils are equal, round, and reactive to light.   Cardiovascular:      Rate and Rhythm: Normal rate and regular rhythm.      Heart sounds: Normal heart sounds.    Pulmonary:      Effort: Pulmonary effort is normal.      Breath sounds: Normal breath sounds.   Abdominal:      General: Bowel sounds are normal.      Palpations: Abdomen is soft.      Tenderness: There is no right CVA tenderness or left CVA tenderness.   Genitourinary:     Penis: Normal.       Testes: Normal.   Musculoskeletal:         General: Normal range of motion.      Cervical back: Normal range of motion and neck supple.   Skin:     General: Skin is warm and dry.   Neurological:      General: No focal deficit present.      Mental Status: He is alert. He is disoriented.      Deep Tendon Reflexes: Reflexes are normal and symmetric.   Psychiatric:         Behavior: Behavior normal.         Thought Content: Thought content normal.         Judgment: Judgment normal.       Assessment:       1. Sterilization consult    2. Other male erectile dysfunction          Plan:       Patient Instructions   Patient to start Cialis 10 mg prior to sexual activity.  Schedule vasectomy next available date in office  Patient will need to take at least 2 days afterwards a off and use ice for 2 days.  Prescription for Cipro, Valium and Norco given to patient.  Patient to  an old medication still day of surgery.

## 2022-09-02 NOTE — PATIENT INSTRUCTIONS
Patient to start Cialis 10 mg prior to sexual activity.  Schedule vasectomy next available date in office  Patient will need to take at least 2 days afterwards a off and use ice for 2 days.  Prescription for Cipro, Valium and Norco given to patient.  Patient to  an old medication still day of surgery.

## 2022-09-14 ENCOUNTER — PATIENT MESSAGE (OUTPATIENT)
Dept: FAMILY MEDICINE | Facility: CLINIC | Age: 47
End: 2022-09-14
Payer: COMMERCIAL

## 2022-10-19 ENCOUNTER — OFFICE VISIT (OUTPATIENT)
Dept: FAMILY MEDICINE | Facility: CLINIC | Age: 47
End: 2022-10-19
Payer: COMMERCIAL

## 2022-10-19 VITALS
HEIGHT: 71 IN | BODY MASS INDEX: 27.19 KG/M2 | TEMPERATURE: 98 F | WEIGHT: 194.25 LBS | SYSTOLIC BLOOD PRESSURE: 120 MMHG | OXYGEN SATURATION: 98 % | DIASTOLIC BLOOD PRESSURE: 78 MMHG | HEART RATE: 65 BPM

## 2022-10-19 DIAGNOSIS — Z13.29 THYROID DISORDER SCREEN: ICD-10-CM

## 2022-10-19 DIAGNOSIS — Z00.00 ENCOUNTER FOR BLOOD TEST FOR ROUTINE GENERAL PHYSICAL EXAMINATION: ICD-10-CM

## 2022-10-19 DIAGNOSIS — E61.1 IRON DEFICIENCY: ICD-10-CM

## 2022-10-19 DIAGNOSIS — Z86.010 HISTORY OF COLON POLYPS: ICD-10-CM

## 2022-10-19 DIAGNOSIS — R73.03 PREDIABETES: ICD-10-CM

## 2022-10-19 DIAGNOSIS — R74.01 ELEVATED AST (SGOT): ICD-10-CM

## 2022-10-19 DIAGNOSIS — I10 ESSENTIAL HYPERTENSION: Primary | ICD-10-CM

## 2022-10-19 DIAGNOSIS — Z13.220 SCREENING CHOLESTEROL LEVEL: ICD-10-CM

## 2022-10-19 DIAGNOSIS — R01.1 SYSTOLIC MURMUR: ICD-10-CM

## 2022-10-19 DIAGNOSIS — N52.8 OTHER MALE ERECTILE DYSFUNCTION: ICD-10-CM

## 2022-10-19 PROBLEM — Z91.148 NONCOMPLIANCE WITH MEDICATION REGIMEN: Status: RESOLVED | Noted: 2021-08-03 | Resolved: 2022-10-19

## 2022-10-19 PROCEDURE — 3044F PR MOST RECENT HEMOGLOBIN A1C LEVEL <7.0%: ICD-10-PCS | Mod: CPTII,S$GLB,, | Performed by: NURSE PRACTITIONER

## 2022-10-19 PROCEDURE — 99214 OFFICE O/P EST MOD 30 MIN: CPT | Mod: S$GLB,,, | Performed by: NURSE PRACTITIONER

## 2022-10-19 PROCEDURE — 1160F PR REVIEW ALL MEDS BY PRESCRIBER/CLIN PHARMACIST DOCUMENTED: ICD-10-PCS | Mod: CPTII,S$GLB,, | Performed by: NURSE PRACTITIONER

## 2022-10-19 PROCEDURE — 99999 PR PBB SHADOW E&M-EST. PATIENT-LVL IV: ICD-10-PCS | Mod: PBBFAC,,, | Performed by: NURSE PRACTITIONER

## 2022-10-19 PROCEDURE — 3078F PR MOST RECENT DIASTOLIC BLOOD PRESSURE < 80 MM HG: ICD-10-PCS | Mod: CPTII,S$GLB,, | Performed by: NURSE PRACTITIONER

## 2022-10-19 PROCEDURE — 3074F PR MOST RECENT SYSTOLIC BLOOD PRESSURE < 130 MM HG: ICD-10-PCS | Mod: CPTII,S$GLB,, | Performed by: NURSE PRACTITIONER

## 2022-10-19 PROCEDURE — 1159F MED LIST DOCD IN RCRD: CPT | Mod: CPTII,S$GLB,, | Performed by: NURSE PRACTITIONER

## 2022-10-19 PROCEDURE — 1160F RVW MEDS BY RX/DR IN RCRD: CPT | Mod: CPTII,S$GLB,, | Performed by: NURSE PRACTITIONER

## 2022-10-19 PROCEDURE — 3044F HG A1C LEVEL LT 7.0%: CPT | Mod: CPTII,S$GLB,, | Performed by: NURSE PRACTITIONER

## 2022-10-19 PROCEDURE — 3074F SYST BP LT 130 MM HG: CPT | Mod: CPTII,S$GLB,, | Performed by: NURSE PRACTITIONER

## 2022-10-19 PROCEDURE — 99999 PR PBB SHADOW E&M-EST. PATIENT-LVL IV: CPT | Mod: PBBFAC,,, | Performed by: NURSE PRACTITIONER

## 2022-10-19 PROCEDURE — 1159F PR MEDICATION LIST DOCUMENTED IN MEDICAL RECORD: ICD-10-PCS | Mod: CPTII,S$GLB,, | Performed by: NURSE PRACTITIONER

## 2022-10-19 PROCEDURE — 4010F ACE/ARB THERAPY RXD/TAKEN: CPT | Mod: CPTII,S$GLB,, | Performed by: NURSE PRACTITIONER

## 2022-10-19 PROCEDURE — 4010F PR ACE/ARB THEARPY RXD/TAKEN: ICD-10-PCS | Mod: CPTII,S$GLB,, | Performed by: NURSE PRACTITIONER

## 2022-10-19 PROCEDURE — 3078F DIAST BP <80 MM HG: CPT | Mod: CPTII,S$GLB,, | Performed by: NURSE PRACTITIONER

## 2022-10-19 PROCEDURE — 99214 PR OFFICE/OUTPT VISIT, EST, LEVL IV, 30-39 MIN: ICD-10-PCS | Mod: S$GLB,,, | Performed by: NURSE PRACTITIONER

## 2022-10-19 NOTE — PROGRESS NOTES
"Subjective:       Patient ID: Salvador Pierce Jr. is a 47 y.o. male.    Chief Complaint: Follow-up (6 month follow up - did not get fasting labs)    HPI    Patient is a 47-year-old black male with hypertension, systolic murmur, IFG/Prediabetes, iron-deficiency anemia, an abnormal TSH/subclinical hyperthyroidism noted on wellness exam in June 2018 that had normal thyroid panel with negative antibodies, and personal history of multiple colon polyps that is here today for 6 month follow up - did not go get fasting labs prior to visit - will schedule for this week.     Hypertension  currently taking Amlodipine/Valsartan 10/320 mg daily and Bystolic 10 mg daily  Intolerance of Lisinopril due to complaint of Sexual Dysfunction/ED  /78   Pulse 65   Temp 97.7 °F (36.5 °C) (Temporal)   Ht 5' 11" (1.803 m)   Wt 88.1 kg (194 lb 3.6 oz)   SpO2 98%   BMI 27.09 kg/m²         Very mild grade 1/6 systolic murmur   noted as a child and had evaluated by cardiologist in the past without any acute abnormalities.     IFG/Prediabetes   with HgbA1C 5.8% in April 2022  Worked on lifestyle modifications and due to recheck levels  Will schedule for this week    Iron Deficiency Anemia  Saturated iron 12% in April 2022  Started on Ferrous Sulfate 325 mg daily in addition to multivitamin with iron   Due to recheck levels    Elevated AST  AST 63 in April 2022  Due to recheck     Subclinical Hyperthyroidism/Abnormal TSH  Patient had an abnormal TSH on wellness labs in June 2018.    His TSH was mildly low at 0.145 but his free T4 was within normal limits and patient denied any hyperthyroid symptoms at the time.   Subclinical Hyperthyroidsim being monitored.    In July 2019,  Patient had full thyroid panel and thyroid ultrasound for evaluation.  TSH remained low at 0.116 but rest of T3 and T4 labs are okay.  His thyroid antibodies were also NEGATIVE and his thyroid ultrasound showed no significant abnormality so no further " "workup is needed unless he becomes symptomatic.    Monitoring thyroid level yearly now - due to check April 2023     History of multiple colon polyps  Colonoscopy done 3/17/2022 Dr. Ferrari CRS  Due to repeat in 6 months - September 2022 - states scheduled for January 2023                 Review of Systems   Constitutional:  Negative for activity change, appetite change, fatigue, fever and unexpected weight change.   HENT:  Negative for congestion, ear pain, mouth sores, nosebleeds, postnasal drip, rhinorrhea, sinus pressure, sneezing, sore throat, trouble swallowing and voice change.    Eyes: Negative.    Respiratory:  Negative for cough, chest tightness and shortness of breath.    Cardiovascular:  Negative for chest pain, palpitations and leg swelling.   Gastrointestinal: Negative.  Negative for abdominal pain, blood in stool, constipation, diarrhea, nausea and vomiting.   Endocrine: Negative.    Genitourinary:  Negative for difficulty urinating, dysuria, flank pain, hematuria and urgency.   Musculoskeletal:  Negative for arthralgias, back pain, gait problem, joint swelling, myalgias and neck pain.   Skin:  Negative for color change, rash and wound.   Allergic/Immunologic: Negative for immunocompromised state.   Neurological:  Negative for dizziness, tremors, seizures, syncope, speech difficulty and headaches.   Hematological:  Negative for adenopathy. Does not bruise/bleed easily.   Psychiatric/Behavioral:  Negative for behavioral problems, dysphoric mood, sleep disturbance and suicidal ideas. The patient is not nervous/anxious.        Objective:     Vitals:    10/19/22 1001   BP: 120/78   Pulse: 65   Temp: 97.7 °F (36.5 °C)   TempSrc: Temporal   SpO2: 98%   Weight: 88.1 kg (194 lb 3.6 oz)   Height: 5' 11" (1.803 m)          Physical Exam  Constitutional:       General: He is not in acute distress.     Appearance: Normal appearance. He is well-developed. He is not ill-appearing, toxic-appearing or diaphoretic. "      Comments: Body mass index is 27.09 kg/m².       HENT:      Head: Normocephalic and atraumatic.      Right Ear: Tympanic membrane and external ear normal.      Left Ear: Tympanic membrane and external ear normal.      Nose: Nose normal.   Eyes:      General: No scleral icterus.        Right eye: No discharge.         Left eye: No discharge.      Extraocular Movements: Extraocular movements intact.      Conjunctiva/sclera: Conjunctivae normal.   Neck:      Thyroid: No thyroid mass or thyromegaly.      Vascular: No JVD.      Trachea: No tracheal deviation.   Cardiovascular:      Rate and Rhythm: Normal rate and regular rhythm.      Heart sounds: Murmur heard.   Systolic murmur is present with a grade of 1/6.      Comments: Grade 1/6 very mild Murmur to right sternal border  Pulmonary:      Effort: Pulmonary effort is normal. No respiratory distress.      Breath sounds: Normal breath sounds. No stridor. No wheezing, rhonchi or rales.   Abdominal:      General: There is no distension.      Palpations: Abdomen is soft. There is no mass.      Tenderness: There is no abdominal tenderness. There is no guarding or rebound.      Hernia: No hernia is present.   Musculoskeletal:         General: No swelling or deformity. Normal range of motion.      Cervical back: Normal range of motion and neck supple.      Right lower leg: No edema.      Left lower leg: No edema.   Lymphadenopathy:      Cervical: No cervical adenopathy.   Skin:     General: Skin is warm and dry.      Findings: No rash.   Neurological:      Mental Status: He is alert and oriented to person, place, and time.      Coordination: Coordination normal.   Psychiatric:         Mood and Affect: Mood normal.         Behavior: Behavior normal.         Thought Content: Thought content normal.         Judgment: Judgment normal.         Assessment:         ICD-10-CM ICD-9-CM   1. Essential hypertension  I10 401.9   2. Systolic murmur  R01.1 785.2   3. Prediabetes   R73.03 790.29   4. Elevated AST (SGOT)  R74.01 790.4   5. Iron deficiency  E61.1 280.9   6. History of colon polyps  Z86.010 V12.72   7. Other male erectile dysfunction  N52.8 607.84   8. Encounter for blood test for routine general physical examination  Z00.00 V72.62   9. Thyroid disorder screen  Z13.29 V77.0   10. Screening cholesterol level  Z13.220 V77.91       Plan:       Essential hypertension  Continue current medication(s).  Follow up in 6 months.      Systolic murmur  stable    Prediabetes  Due for fasting labs THIS WEEK  -     Comprehensive Metabolic Panel; Future; Expected date: 04/03/2023  -     Hemoglobin A1C; Future; Expected date: 04/03/2023    Elevated AST (SGOT)    Iron deficiency  Due for fasting labs this week  -     CBC Auto Differential; Future; Expected date: 04/03/2023  -     Iron and TIBC; Future; Expected date: 04/03/2023  -     Ferritin; Future; Expected date: 04/03/2023    History of colon polyps  Reports colonoscopy scheduled for January    Other male erectile dysfunction  Condition followed/treated by Specialist.  Please follow up with Specialist as advised.      Encounter for blood test for routine general physical examination  -     CBC Auto Differential; Future; Expected date: 04/03/2023  -     Comprehensive Metabolic Panel; Future; Expected date: 04/03/2023  -     Hemoglobin A1C; Future; Expected date: 04/03/2023  -     Lipid Panel; Future; Expected date: 04/03/2023  -     TSH; Future; Expected date: 04/03/2023    Thyroid disorder screen  -     TSH; Future; Expected date: 04/03/2023    Screening cholesterol level  -     Lipid Panel; Future; Expected date: 04/03/2023    Follow up in about 6 months (around 4/19/2023) for fasting labs and WELLNESS EXAM; labs this week.     Patient's Medications   New Prescriptions    No medications on file   Previous Medications    AMLODIPINE-VALSARTAN (EXFORGE)  MG PER TABLET    Take 1 tablet by mouth once daily.    DIAZEPAM (VALIUM) 10 MG TAB     Take 1 tablet (10 mg total) by mouth On call Procedure.    FERROUS SULFATE (FEOSOL) 325 MG (65 MG IRON) TAB TABLET    Take 325 mg by mouth daily with breakfast.    MULTIVITAMIN CAPSULE    Take 1 capsule by mouth once daily.    NEBIVOLOL (BYSTOLIC) 10 MG TAB    Take 1 tablet (10 mg total) by mouth once daily.    TADALAFIL (CIALIS) 10 MG TABLET    Take 1 tablet (10 mg total) by mouth daily as needed for Erectile Dysfunction.   Modified Medications    No medications on file   Discontinued Medications    No medications on file       Past Medical History:   Diagnosis Date    Abnormal TSH 6/20/2018    Patient had a mildly low TSH in June 2018 but normal free T4 and asymtomatic.  Will continue to monitor levels    Colon polyp 3/17/2022    Hypertension     Mild anemia 6/20/2018    noted on wellness - started on multivitamin    Subclinical hyperthyroidism 10/19/2018    TSH remains low at 0.116 but rest of T3 and T4 labs are okay.  His thyroid antibodies are also negative and his thyroid ultrasound showed no significant abnormality so no further workup is needed unless he becomes symptomatic.  Will monitor thyroid level yearly now.     Systolic murmur     reports noted as a child - had evaluated by cardiologist in the past       Past Surgical History:   Procedure Laterality Date    COLONOSCOPY N/A 3/17/2022    Procedure: COLONOSCOPY;  Surgeon: CARRI Ferrari MD;  Location: [facility];  Service: Endoscopy;  Laterality: N/A;    HERNIA REPAIR      right inguinal hernia at age 12       Family History   Problem Relation Age of Onset    Hypertension Mother     Multiple sclerosis Mother     Diabetes Father     Hypertension Father     Kidney disease Father     No Known Problems Brother     Early death Neg Hx        Social History     Socioeconomic History    Marital status:    Occupational History    Occupation: supervisory position   Tobacco Use    Smoking status: Former     Packs/day: 0.50     Years: 21.00     Pack  years: 10.50     Types: Cigars, Cigarettes     Quit date: 2018     Years since quittin.6    Smokeless tobacco: Never   Substance and Sexual Activity    Alcohol use: Yes     Alcohol/week: 2.0 standard drinks     Types: 2 Cans of beer per week     Comment: mostly on weekends -  either a couple a beers or a couple drinks    Drug use: No    Sexual activity: Yes     Partners: Female     Birth control/protection: I.U.D.     Comment: My wife has IUD     Social Determinants of Health     Financial Resource Strain: Low Risk     Difficulty of Paying Living Expenses: Not hard at all   Food Insecurity: No Food Insecurity    Worried About Running Out of Food in the Last Year: Never true    Ran Out of Food in the Last Year: Never true   Transportation Needs: No Transportation Needs    Lack of Transportation (Medical): No    Lack of Transportation (Non-Medical): No   Physical Activity: Insufficiently Active    Days of Exercise per Week: 3 days    Minutes of Exercise per Session: 40 min   Stress: No Stress Concern Present    Feeling of Stress : Not at all   Social Connections: Unknown    Frequency of Communication with Friends and Family: More than three times a week    Frequency of Social Gatherings with Friends and Family: Once a week    Active Member of Clubs or Organizations: No    Attends Club or Organization Meetings: Never    Marital Status:    Housing Stability: Low Risk     Unable to Pay for Housing in the Last Year: No    Number of Places Lived in the Last Year: 1    Unstable Housing in the Last Year: No

## 2022-11-03 ENCOUNTER — PATIENT MESSAGE (OUTPATIENT)
Dept: SURGERY | Facility: CLINIC | Age: 47
End: 2022-11-03
Payer: COMMERCIAL

## 2022-11-04 ENCOUNTER — TELEPHONE (OUTPATIENT)
Dept: ENDOSCOPY | Facility: HOSPITAL | Age: 47
End: 2022-11-04
Payer: COMMERCIAL

## 2022-11-11 ENCOUNTER — TELEPHONE (OUTPATIENT)
Dept: ENDOSCOPY | Facility: HOSPITAL | Age: 47
End: 2022-11-11
Payer: COMMERCIAL

## 2022-12-19 DIAGNOSIS — I10 ESSENTIAL HYPERTENSION: ICD-10-CM

## 2022-12-19 RX ORDER — NEBIVOLOL 10 MG/1
10 TABLET ORAL DAILY
Qty: 30 TABLET | Refills: 5 | Status: SHIPPED | OUTPATIENT
Start: 2022-12-19 | End: 2023-07-12 | Stop reason: SDUPTHER

## 2022-12-19 RX ORDER — AMLODIPINE AND VALSARTAN 10; 320 MG/1; MG/1
1 TABLET ORAL DAILY
Qty: 30 TABLET | Refills: 5 | Status: SHIPPED | OUTPATIENT
Start: 2022-12-19 | End: 2023-07-12 | Stop reason: SDUPTHER

## 2023-01-04 ENCOUNTER — PROCEDURE VISIT (OUTPATIENT)
Dept: UROLOGY | Facility: CLINIC | Age: 48
End: 2023-01-04
Payer: COMMERCIAL

## 2023-01-04 VITALS — HEIGHT: 71 IN | WEIGHT: 192 LBS | BODY MASS INDEX: 26.88 KG/M2

## 2023-01-04 DIAGNOSIS — Z30.09 STERILIZATION CONSULT: Primary | ICD-10-CM

## 2023-01-04 DIAGNOSIS — Z30.09 STERILIZATION CONSULT: ICD-10-CM

## 2023-01-04 DIAGNOSIS — Z30.2 ENCOUNTER FOR VASECTOMY: ICD-10-CM

## 2023-01-04 DIAGNOSIS — Z30.2 ENCOUNTER FOR VASECTOMY: Primary | ICD-10-CM

## 2023-01-04 PROCEDURE — 55250 REMOVAL OF SPERM DUCT(S): CPT | Mod: S$GLB,,, | Performed by: UROLOGY

## 2023-01-04 PROCEDURE — 55250 VASECTOMY: ICD-10-PCS | Mod: S$GLB,,, | Performed by: UROLOGY

## 2023-01-04 RX ORDER — HYDROCODONE BITARTRATE AND ACETAMINOPHEN 5; 325 MG/1; MG/1
1 TABLET ORAL EVERY 6 HOURS PRN
Qty: 20 TABLET | Refills: 0 | Status: SHIPPED | OUTPATIENT
Start: 2023-01-04 | End: 2023-01-09

## 2023-01-04 RX ORDER — CIPROFLOXACIN 500 MG/1
500 TABLET ORAL 2 TIMES DAILY
Qty: 10 TABLET | Refills: 0 | Status: SHIPPED | OUTPATIENT
Start: 2023-01-04 | End: 2023-01-09

## 2023-01-04 RX ORDER — DIAZEPAM 10 MG/1
10 TABLET ORAL
Qty: 1 TABLET | Refills: 0 | Status: SHIPPED | OUTPATIENT
Start: 2023-01-04 | End: 2023-06-19 | Stop reason: ALTCHOICE

## 2023-01-04 NOTE — PROCEDURES
"Vasectomy    Date/Time: 1/4/2023 1:30 PM  Performed by: Noah Serra MD  Authorized by: Noah Serra MD     Consent Done?:  Yes (Written)  Time out: Immediately prior to procedure a "time out" was called to verify the correct patient, procedure, equipment, support staff and site/side marked as required.    Indications:  [place] male  Position:  Other (Supine)  Patient sedated: Yes    Procedure - Sedation Type and Amount: 10 mg Valium.  Preparation: Patient was prepped and draped in usual sterile fashion    Incisions:  Scalpel-less and 1  Length vas excised:  2.5 cm  Vas:  Fulgurated, Tied and Buried  Sutures:  3-0 chromic SH  Procedure details - Skin closures: open.  Same procedure performed on both sides    Patient tolerance:  Patient tolerated the procedure well with no immediate complications  "

## 2023-01-04 NOTE — PATIENT INSTRUCTIONS
What to Expect After a Vasectomy  You cannot drive or operate heavy machinery on the day of the procedure.    Apply ice packs to the scrotal area for 24-48 hours. Avoid direct contact of the ice pack with the skin. Scrotal supports, jock straps, or fitted underwear help elevate the scrotum and reduce discomfort.    You may shower the next day. Gently apply soapy water to the scrotum to wash. Rinse and dry yourself by blotting the skin, not rubbing.    Avoid strenuous physical exercises or sexual relations for at least one week after a vasectomy.    Continue to use birth control for at least 6 weeks or 10-20 ejaculations. You are still considered fertile until your urologist examines a post-vasectomy semen analysis at 6 weeks and perhaps one at 8 weeks as well. Drop off the specimen at the 4th floor [facility] between 8am and 4pm.    Do NOT resume unprotected sexual activity until your physician finds no sperm in your semen.    All stitches will dissolve on their own in 1-2 weeks.    Signs and Symptoms to Report  A large amount of bleeding at the site  An unusual amount of pain  A large amount of swelling in the scrotum  Fever and chills  Any signs of infection, such as redness at the site or foul-smelling drainage    Risks  The risks of complication after vasectomy are very low. A few of the risks include:  Bleeding  Infection  Scrotal hematoma - a collection of blood in the scrotum  Inflammation of the epididymis - inflammation of a structure next to the testicle that helps in maturation of sperm  Sperm granuloma - a collection of sperm that leaks out from the vas deferens, forming a small nodule or lump. This does not usually cause any discomfort but you may feel it in the scrotum.  Recanalization - the restoration of the lumen or transport tube between the two ends of the vas deferens, possibly causing fertility    If you have any questions or concerns, please call your [facility] urologist at  [phone].

## 2023-02-09 ENCOUNTER — LAB VISIT (OUTPATIENT)
Dept: LAB | Facility: HOSPITAL | Age: 48
End: 2023-02-09
Attending: UROLOGY
Payer: COMMERCIAL

## 2023-02-09 DIAGNOSIS — Z30.09 STERILIZATION CONSULT: ICD-10-CM

## 2023-02-09 DIAGNOSIS — Z30.2 ENCOUNTER FOR VASECTOMY: ICD-10-CM

## 2023-02-09 PROCEDURE — 89320 SEMEN ANAL VOL/COUNT/MOT: CPT | Performed by: UROLOGY

## 2023-02-10 ENCOUNTER — PATIENT MESSAGE (OUTPATIENT)
Dept: UROLOGY | Facility: CLINIC | Age: 48
End: 2023-02-10
Payer: COMMERCIAL

## 2023-02-24 ENCOUNTER — PATIENT MESSAGE (OUTPATIENT)
Dept: UROLOGY | Facility: CLINIC | Age: 48
End: 2023-02-24
Payer: COMMERCIAL

## 2023-02-24 LAB
SPECIMEN VOL SMN: 3.5 ML
SPERM P VAS # SMN: NORMAL M/ML

## 2023-04-03 NOTE — PROGRESS NOTES
Do see the dermatology person for the skin area on the scalp   See in about august or September   Use voltaren gel over the counter on the right wrist Last 5 Patient Entered Readings                                      Current 30 Day Average: 143/83     Recent Readings 5/25/2019    SBP (mmHg) 143    DBP (mmHg) 83    Pulse 62          Not available. Will call back in 20 min.

## 2023-04-20 ENCOUNTER — PATIENT MESSAGE (OUTPATIENT)
Dept: FAMILY MEDICINE | Facility: CLINIC | Age: 48
End: 2023-04-20
Payer: COMMERCIAL

## 2023-06-19 ENCOUNTER — OFFICE VISIT (OUTPATIENT)
Dept: FAMILY MEDICINE | Facility: CLINIC | Age: 48
End: 2023-06-19
Payer: COMMERCIAL

## 2023-06-19 VITALS
HEART RATE: 70 BPM | WEIGHT: 197.06 LBS | SYSTOLIC BLOOD PRESSURE: 130 MMHG | TEMPERATURE: 98 F | OXYGEN SATURATION: 96 % | BODY MASS INDEX: 27.59 KG/M2 | HEIGHT: 71 IN | DIASTOLIC BLOOD PRESSURE: 74 MMHG

## 2023-06-19 DIAGNOSIS — I10 ESSENTIAL HYPERTENSION: ICD-10-CM

## 2023-06-19 DIAGNOSIS — E61.1 IRON DEFICIENCY: ICD-10-CM

## 2023-06-19 DIAGNOSIS — R01.1 SYSTOLIC MURMUR: ICD-10-CM

## 2023-06-19 DIAGNOSIS — Z86.010 HISTORY OF COLON POLYPS: ICD-10-CM

## 2023-06-19 DIAGNOSIS — D64.9 MILD ANEMIA: ICD-10-CM

## 2023-06-19 DIAGNOSIS — N52.8 OTHER MALE ERECTILE DYSFUNCTION: ICD-10-CM

## 2023-06-19 DIAGNOSIS — E05.90 SUBCLINICAL HYPERTHYROIDISM: ICD-10-CM

## 2023-06-19 DIAGNOSIS — R09.89 SCATTERED RHONCHI OF LEFT LUNG: ICD-10-CM

## 2023-06-19 DIAGNOSIS — Z00.00 ANNUAL PHYSICAL EXAM: Primary | ICD-10-CM

## 2023-06-19 DIAGNOSIS — R73.03 PREDIABETES: ICD-10-CM

## 2023-06-19 PROBLEM — Z86.0100 HISTORY OF COLON POLYPS: Status: ACTIVE | Noted: 2023-06-19

## 2023-06-19 PROCEDURE — 3075F SYST BP GE 130 - 139MM HG: CPT | Mod: CPTII,S$GLB,, | Performed by: NURSE PRACTITIONER

## 2023-06-19 PROCEDURE — 3078F PR MOST RECENT DIASTOLIC BLOOD PRESSURE < 80 MM HG: ICD-10-PCS | Mod: CPTII,S$GLB,, | Performed by: NURSE PRACTITIONER

## 2023-06-19 PROCEDURE — 99396 PR PREVENTIVE VISIT,EST,40-64: ICD-10-PCS | Mod: S$GLB,,, | Performed by: NURSE PRACTITIONER

## 2023-06-19 PROCEDURE — 3044F HG A1C LEVEL LT 7.0%: CPT | Mod: CPTII,S$GLB,, | Performed by: NURSE PRACTITIONER

## 2023-06-19 PROCEDURE — 1160F RVW MEDS BY RX/DR IN RCRD: CPT | Mod: CPTII,S$GLB,, | Performed by: NURSE PRACTITIONER

## 2023-06-19 PROCEDURE — 3078F DIAST BP <80 MM HG: CPT | Mod: CPTII,S$GLB,, | Performed by: NURSE PRACTITIONER

## 2023-06-19 PROCEDURE — 99396 PREV VISIT EST AGE 40-64: CPT | Mod: S$GLB,,, | Performed by: NURSE PRACTITIONER

## 2023-06-19 PROCEDURE — 1159F MED LIST DOCD IN RCRD: CPT | Mod: CPTII,S$GLB,, | Performed by: NURSE PRACTITIONER

## 2023-06-19 PROCEDURE — 99999 PR PBB SHADOW E&M-EST. PATIENT-LVL IV: CPT | Mod: PBBFAC,,, | Performed by: NURSE PRACTITIONER

## 2023-06-19 PROCEDURE — 1159F PR MEDICATION LIST DOCUMENTED IN MEDICAL RECORD: ICD-10-PCS | Mod: CPTII,S$GLB,, | Performed by: NURSE PRACTITIONER

## 2023-06-19 PROCEDURE — 1160F PR REVIEW ALL MEDS BY PRESCRIBER/CLIN PHARMACIST DOCUMENTED: ICD-10-PCS | Mod: CPTII,S$GLB,, | Performed by: NURSE PRACTITIONER

## 2023-06-19 PROCEDURE — 3075F PR MOST RECENT SYSTOLIC BLOOD PRESS GE 130-139MM HG: ICD-10-PCS | Mod: CPTII,S$GLB,, | Performed by: NURSE PRACTITIONER

## 2023-06-19 PROCEDURE — 3044F PR MOST RECENT HEMOGLOBIN A1C LEVEL <7.0%: ICD-10-PCS | Mod: CPTII,S$GLB,, | Performed by: NURSE PRACTITIONER

## 2023-06-19 PROCEDURE — 99999 PR PBB SHADOW E&M-EST. PATIENT-LVL IV: ICD-10-PCS | Mod: PBBFAC,,, | Performed by: NURSE PRACTITIONER

## 2023-06-19 PROCEDURE — 3008F PR BODY MASS INDEX (BMI) DOCUMENTED: ICD-10-PCS | Mod: CPTII,S$GLB,, | Performed by: NURSE PRACTITIONER

## 2023-06-19 PROCEDURE — 3008F BODY MASS INDEX DOCD: CPT | Mod: CPTII,S$GLB,, | Performed by: NURSE PRACTITIONER

## 2023-06-19 PROCEDURE — 4010F ACE/ARB THERAPY RXD/TAKEN: CPT | Mod: CPTII,S$GLB,, | Performed by: NURSE PRACTITIONER

## 2023-06-19 PROCEDURE — 4010F PR ACE/ARB THEARPY RXD/TAKEN: ICD-10-PCS | Mod: CPTII,S$GLB,, | Performed by: NURSE PRACTITIONER

## 2023-06-19 NOTE — PROGRESS NOTES
"Subjective:       Patient ID: Salvador Pierce Jr. is a 48 y.o. male.    Chief Complaint: Annual Exam    HPI    Patient is a 48-year-old black male with hypertension, systolic murmur, IFG/Prediabetes, iron-deficiency anemia, an abnormal TSH/subclinical hyperthyroidism noted on wellness exam in June 2018 that had normal thyroid panel with negative antibodies, and personal history of multiple colon polyps that is here today for ANNUAL physical exam with fasting lab results.     Hypertension  currently taking Amlodipine/Valsartan 10/320 mg daily and Bystolic 10 mg daily  Intolerance of Lisinopril due to complaint of Sexual Dysfunction/ED  /74 (BP Location: Right arm, Patient Position: Sitting, BP Method: Large (Manual))   Pulse 70   Temp 97.9 °F (36.6 °C) (Temporal)   Ht 5' 11" (1.803 m)   Wt 89.4 kg (197 lb 1.5 oz)   SpO2 96%   BMI 27.49 kg/m²           Very mild grade 1/6 systolic murmur   noted as a child and had evaluated by cardiologist in the past without any acute abnormalities.     IFG/Prediabetes   with HgbA1C 5.8% in April 2022  Worked on lifestyle modifications   FBG now 109 with HgbA1C down to 5.4%     Iron Deficiency Anemia  Saturated iron 12% in April 2022  Started on Ferrous Sulfate 325 mg daily in addition to multivitamin with iron   Saturated iron now 18% and anemia improving.     Elevated AST  AST 63 in April 2022  Liver enzymes elevated in October 2022 but now back within normal range this month  AST 27 and ALT 23     Subclinical Hyperthyroidism/Abnormal TSH  Patient had an abnormal TSH on wellness labs in June 2018.    His TSH was mildly low at 0.145 but his free T4 was within normal limits and patient denied any hyperthyroid symptoms at the time.   Subclinical Hyperthyroidsim being monitored.    In July 2019,  Patient had full thyroid panel and thyroid ultrasound for evaluation.  TSH remained low at 0.116 but rest of T3 and T4 labs are okay.  His thyroid antibodies were also " NEGATIVE and his thyroid ultrasound showed no significant abnormality so no further workup is needed unless he becomes symptomatic.    Monitoring thyroid level yearly now   TSH in 0.223 in October 2022     History of multiple colon polyps  Colonoscopy done 3/17/2022 Dr. Ferrari CRS  Due to repeat in 6 months - done December 2022  Last colonoscopy 12/29/22 - + adenomas - repeat in 3 years.    Wellness labs:  CBC with mild anemia improving  CMP [place]  Cholesterol done in October 2022 within range  TSH done in October 2022 [facility] Maintenance:  Up to date    Component      Latest Ref Rng & Units 6/8/2023 10/20/2022 4/20/2022               WBC      3.90 - 12.70 K/uL 10.76 9.57 11.74   RBC      4.60 - 6.20 M/uL 4.47 (L) 4.38 (L) 4.43 (L)   Hemoglobin      14.0 - 18.0 g/dL 13.8 (L) 13.5 (L) 13.7 (L)   Hematocrit      40.0 - 54.0 % 41.8 40.8 40.8   MCV      82 - 98 fL 94 93 92   MCH      27.0 - 31.0 pg 30.9 30.8 30.9   MCHC      32.0 - 36.0 g/dL 33.0 33.1 33.6   RDW      11.5 - 14.5 % 14.2 14.5 14.7 (H)   Platelets      150 - 450 K/uL 183 208 211   MPV      9.2 - 12.9 fL 12.3 11.5 12.8   Immature Granulocytes      0.0 - 0.5 % 0.3 0.3 0.3   Immature Grans (Abs)      0.00 - 0.04 K/uL 0.03 0.03 0.03   Lymph #      1.0 - 4.8 K/uL 3.2 2.7 3.4   Mono #      0.3 - 1.0 K/uL 1.0 0.9 1.1 (H)   Eos #      0.0 - 0.5 K/uL 0.2 0.2 0.2   Baso #      0.00 - 0.20 K/uL 0.04 0.07 0.06   nRBC      0 /100 WBC 0 0 0   Gran %      38.0 - 73.0 % 58.5 59.0 59.0   Lymph %      18.0 - 48.0 % 30.0 28.2 29.0   Mono %      4.0 - 15.0 % 8.8 9.6 9.5   Eosinophil %      0.0 - 8.0 % 2.0 2.2 1.7   Basophil %      0.0 - 1.9 % 0.4 0.7 0.5   Bands      %      Hypo            Target Cells            Differential Method       Automated Automated Automated   Gran # (ANC)      1.8 - 7.7 K/uL 6.3 5.6 6.9   Sodium      136 - 145 mmol/L 140 141 143   Potassium      3.5 - 5.1 mmol/L 4.0 4.5 4.1   Chloride      95 - 110 mmol/L 109 103 111 (H)   CO2      23 - 29  mmol/L 20 (L) 29 25   Glucose      70 - 110 mg/dL 109 104 106   BUN      2 - 20 mg/dL 18 16 18   Creatinine      0.50 - 1.40 mg/dL 1.03 1.15 1.07   Calcium      8.7 - 10.5 mg/dL 8.8 9.1 9.2   PROTEIN TOTAL      6.0 - 8.4 g/dL 7.6 8.2 7.9   Albumin      3.5 - 5.2 g/dL 4.4 4.7 4.5   BILIRUBIN TOTAL      0.1 - 1.0 mg/dL 0.2 0.4 0.3   Alkaline Phosphatase      38 - 126 U/L 62 52 61   AST      15 - 46 U/L 27 83 (H) 63 (H)   ALT      10 - 44 U/L 23 59 (H) 29   Anion Gap      8 - 16 mmol/L 11 9 7 (L)   eGFR      >60 mL/min/1.73 m:2 >60.0 >60.0    Iron      45 - 160 ug/dL 59 80 40 (L)   Transferrin      200 - 375 mg/dL 222 234 232   TIBC      250 - 450 ug/dL 329 346 343   Saturated Iron      20 - 50 % 18 (L) 23 12 (L)   Hemoglobin A1C External      4.0 - 5.6 % 5.4 5.5 5.8 (H)   Estimated Avg Glucose      68 - 131 mg/dL 108 111 120   Ferritin      20.0 - 300.0 ng/mL 192 232      Component      Latest Ref Rng & Units 10/20/2022 4/20/2022 11/27/2020   Cholesterol      120 - 199 mg/dL 150 161 169   Triglycerides      30 - 150 mg/dL 74 121 84   HDL      40 - 75 mg/dL 46 38 (L) 52   LDL Cholesterol External      63.0 - 159.0 mg/dL 89.2 98.8 100.2   HDL/Cholesterol Ratio      20.0 - 50.0 % 30.7 23.6 30.8   Total Cholesterol/HDL Ratio      2.0 - 5.0 3.3 4.2 3.3   Non-HDL Cholesterol      mg/dL 104 123 117        Component      Latest Ref Rng & Units 10/20/2022 4/20/2022 11/27/2020 6/17/2019   TSH      0.400 - 4.000 uIU/mL 0.223 (L) 0.226 (L) 0.138 (L) 0.116 (L)     Component      Latest Ref Rng & Units 10/20/2022 6/17/2019   TSH      0.400 - 4.000 uIU/mL 0.223 (L) 0.116 (L)   Free T4      0.71 - 1.51 ng/dL  1.07   T3, Total      60 - 180 ng/dL  79   T4 Total      4.5 - 11.5 ug/dL  7.2   Thyrotropin Receptor Ab      0.00 - 1.75 IU/L  <1.00   Thyroperoxidase Antibodies      <6.0 IU/mL  <6.0     Review of Systems   Constitutional:  Negative for activity change, appetite change, fatigue, fever and unexpected weight change.   HENT:   "Positive for congestion. Negative for ear pain, mouth sores, nosebleeds, postnasal drip, rhinorrhea, sinus pressure, sneezing, sore throat, trouble swallowing and voice change.         Reports he was around a coworker on Friday with URI.  Started with mild congestion today.   Eyes: Negative.    Respiratory:  Negative for cough, chest tightness and shortness of breath.    Cardiovascular:  Negative for chest pain, palpitations and leg swelling.   Gastrointestinal: Negative.  Negative for abdominal pain, blood in stool, constipation, diarrhea, nausea and vomiting.   Endocrine: Negative.    Genitourinary:  Negative for difficulty urinating, dysuria, flank pain, hematuria and urgency.   Musculoskeletal:  Negative for arthralgias, back pain, gait problem, joint swelling, myalgias and neck pain.   Skin:  Negative for color change, rash and wound.   Allergic/Immunologic: Negative for immunocompromised state.   Neurological:  Negative for dizziness, tremors, seizures, syncope, speech difficulty and headaches.   Hematological:  Negative for adenopathy. Does not bruise/bleed easily.   Psychiatric/Behavioral:  Negative for behavioral problems, dysphoric mood, sleep disturbance and suicidal ideas. The patient is not nervous/anxious.        Objective:     Vitals:    06/19/23 0809   BP: 130/74   BP Location: Right arm   Patient Position: Sitting   BP Method: Large (Manual)   Pulse: 70   Temp: 97.9 °F (36.6 °C)   TempSrc: Temporal   SpO2: 96%   Weight: 89.4 kg (197 lb 1.5 oz)   Height: 5' 11" (1.803 m)          Physical Exam  Constitutional:       General: He is not in acute distress.     Appearance: Normal appearance. He is well-developed. He is not ill-appearing, toxic-appearing or diaphoretic.      Comments: Body mass index is 27.09 kg/m².       HENT:      Head: Normocephalic and atraumatic.      Right Ear: Tympanic membrane and external ear normal.      Left Ear: Tympanic membrane and external ear normal.      Nose: Nose " normal.   Eyes:      General: No scleral icterus.        Right eye: No discharge.         Left eye: No discharge.      Extraocular Movements: Extraocular movements intact.      Conjunctiva/sclera: Conjunctivae normal.   Neck:      Thyroid: No thyroid mass or thyromegaly.      Vascular: No JVD.      Trachea: No tracheal deviation.   Cardiovascular:      Rate and Rhythm: Normal rate and regular rhythm.      Heart sounds: Murmur heard.   Systolic murmur is present with a grade of 1/6.      Comments: Grade 1/6 very mild Murmur to right sternal border  Pulmonary:      Effort: Pulmonary effort is normal. No respiratory distress.      Breath sounds: No stridor or decreased air movement. Examination of the left-upper field reveals rhonchi. Rhonchi present. No decreased breath sounds, wheezing or rales.      Comments: Rhonchi to upper left lobe that did not clear with cough - will get CXR to further evaluate.  Abdominal:      General: There is no distension.      Palpations: Abdomen is soft. There is no mass.      Tenderness: There is no abdominal tenderness. There is no guarding or rebound.      Hernia: No hernia is present.   Musculoskeletal:         General: No swelling or deformity. Normal range of motion.      Cervical back: Normal range of motion and neck supple.      Right lower leg: No edema.      Left lower leg: No edema.   Lymphadenopathy:      Cervical: No cervical adenopathy.   Skin:     General: Skin is warm and dry.      Findings: No rash.   Neurological:      Mental Status: He is alert and oriented to person, place, and time.      Coordination: Coordination normal.   Psychiatric:         Mood and Affect: Mood normal.         Behavior: Behavior normal.         Thought Content: Thought content normal.         Judgment: Judgment normal.         Assessment:         ICD-10-CM ICD-9-CM   1. Annual physical exam  Z00.00 V70.0   2. Essential hypertension  I10 401.9   3. Systolic murmur  R01.1 785.2   4. Prediabetes   R73.03 790.29   5. Iron deficiency  E61.1 280.9   6. Mild anemia  D64.9 285.9   7. Subclinical hyperthyroidism  E05.90 242.90   8. History of colon polyps  Z86.010 V12.72   9. Other male erectile dysfunction  N52.8 607.84   10. BMI 27.0-27.9,adult  Z68.27 V85.23   11. Scattered rhonchi of left lung  R09.89 786.7       Plan:       Annual physical exam  Health Maintenance Summary     Full History      Expand All  Collapse All    Postponed - COVID-19 Vaccine  (4 - Pfizer series)  Postponed until 6/19/2024  10/14/2021  Imm Admin: COVID-19, MRNA, LN-S, PF (Pfizer) (Purple Cap)    04/11/2021  Imm Admin: COVID-19, MRNA, LN-S, PF (Pfizer) (Purple Cap)    03/20/2021  Imm Admin: COVID-19, MRNA, LN-S, PF (Pfizer) (Purple Cap)      Scheduled - Hemoglobin A1c (Prediabetes)  (Yearly)  Scheduled for 12/5/2023 06/08/2023  Hemoglobin A1C External component of Hemoglobin A1C    10/20/2022  Hemoglobin A1C External component of Hemoglobin A1C    04/20/2022  Hemoglobin A1C External component of Hemoglobin A1C      Colorectal Cancer Screening  (Colonoscopy - Every 3 Years)  Next due on 12/29/2025 12/29/2022  Colonoscopy    12/29/2022  Surgical Procedure: COLONOSCOPY    03/17/2022  Colonoscopy    03/17/2022  Surgical Procedure: COLONOSCOPY      Lipid Panel  (Every 5 Years)  Next due on 10/20/2027  10/20/2022  Lipid Panel    04/20/2022  Lipid Panel    11/27/2020  Lipid Panel    06/17/2019  Lipid panel    06/20/2018  Lipid panel      TETANUS VACCINE  (Every 10 Years)  Next due on 10/3/2028  10/03/2018  Imm Admin: Tdap      HIV Screening  Completed  11/27/2020  HIV 1/2 Ag/Ab (4th Gen)      Hepatitis C Screening  Completed  04/20/2022  Hepatitis C Ab component of Hepatitis C Antibody      Influenza Vaccine  (Series Information)  Completed  11/01/2022  Imm Admin: Influenza - Quadrivalent - PF *Preferred* (6 months and older)    11/17/2021  Imm Admin: Influenza    11/30/2020  [employer]: WORKFLOW - HEALTHY PLANET - EXTERNAL DATA - EXTERNAL  PROCEDURE DATE - INFLUENZA    10/26/2019  Imm Admin: Influenza    10/03/2018  Imm Admin: Influenza - Quadrivalent - PF *Preferred* (6 months and older)      Pneumococcal Vaccines (Age 0-64)  (Series Information)  Aged Out  No completion history exists for this topic.         Essential hypertension  Continue current medication(s).  Follow up in 6 months.  -     Comprehensive Metabolic Panel; Future; Expected date: 06/19/2023    Systolic murmur  stable    Prediabetes  Levels improved - recheck in 6 months.  -     Comprehensive Metabolic Panel; Future; Expected date: 06/19/2023  -     Hemoglobin A1C; Future; Expected date: 06/19/2023    Iron deficiency  Stay on supplement  -     CBC Auto Differential; Future; Expected date: 06/19/2023  -     Iron and TIBC; Future; Expected date: 06/19/2023    Mild anemia  -     CBC Auto Differential; Future; Expected date: 06/19/2023  -     Iron and TIBC; Future; Expected date: 06/19/2023    Subclinical hyperthyroidism  monitoring  -     TSH; Future; Expected date: 06/19/2023    History of colon polyps  Up to date on colonoscopy    Other male erectile dysfunction    BMI 27.0-27.9,adult    Scattered rhonchi of left lung  CXR to further evaluate today  -     X-Ray Chest PA And Lateral; Future; Expected date: 06/19/2023      Follow up in about 6 months (around 12/19/2023) for fasting labs and follow up.     Patient's Medications   New Prescriptions    No medications on file   Previous Medications    AMLODIPINE-VALSARTAN (EXFORGE)  MG PER TABLET    Take 1 tablet by mouth once daily.    FERROUS SULFATE (FEOSOL) 325 MG (65 MG IRON) TAB TABLET    Take 325 mg by mouth daily with breakfast.    MULTIVITAMIN CAPSULE    Take 1 capsule by mouth once daily.    NEBIVOLOL (BYSTOLIC) 10 MG TAB    Take 1 tablet (10 mg total) by mouth once daily.    TADALAFIL (CIALIS) 10 MG TABLET    Take 1 tablet (10 mg total) by mouth daily as needed for Erectile Dysfunction.   Modified Medications    No  medications on file   Discontinued Medications    DIAZEPAM (VALIUM) 10 MG TAB    Take 1 tablet (10 mg total) by mouth On call Procedure.       Past Medical History:   Diagnosis Date    Abnormal TSH 2018    Patient had a mildly low TSH in 2018 but normal free T4 and asymtomatic.  Will continue to monitor levels    Colon polyp 3/17/2022    Hypertension     Mild anemia 2018    noted on wellness - started on multivitamin    Subclinical hyperthyroidism 10/19/2018    TSH remains low at 0.116 but rest of T3 and T4 labs are okay.  His thyroid antibodies are also negative and his thyroid ultrasound showed no significant abnormality so no further workup is needed unless he becomes symptomatic.  Will monitor thyroid level yearly now.     Systolic murmur     reports noted as a child - had evaluated by cardiologist in the past       Past Surgical History:   Procedure Laterality Date    COLONOSCOPY N/A 3/17/2022    Procedure: COLONOSCOPY;  Surgeon: CARRI Ferrari MD;  Location: [facility];  Service: Endoscopy;  Laterality: N/A;    COLONOSCOPY N/A 2022    Procedure: COLONOSCOPY;  Surgeon: CARRI Ferrari MD;  Location: [facility];  Service: Endoscopy;  Laterality: N/A;  in August or September with me please for retreatment    HERNIA REPAIR      right inguinal hernia at age 12       Family History   Problem Relation Age of Onset    Hypertension Mother     Multiple sclerosis Mother     Diabetes Father     Hypertension Father     Kidney disease Father     No Known Problems Brother     Early death Neg Hx        Social History     Socioeconomic History    Marital status:    Occupational History    Occupation: supervisory position   Tobacco Use    Smoking status: Former     Packs/day: 0.50     Years: 21.00     Pack years: 10.50     Types: Cigars, Cigarettes     Quit date: 2018     Years since quittin.3     Passive exposure: Never    Smokeless tobacco: Never   Substance and Sexual Activity     Alcohol use: Yes     Alcohol/week: 2.0 standard drinks     Types: 2 Cans of beer per week     Comment: mostly on weekends -  either a couple a beers or a couple drinks    Drug use: No    Sexual activity: Yes     Partners: Female     Birth control/protection: I.U.D.     Comment: My wife has IUD     Social Determinants of Health     Financial Resource Strain: Low Risk     Difficulty of Paying Living Expenses: Not hard at all   Food Insecurity: No Food Insecurity    Worried About Running Out of Food in the Last Year: Never true    Ran Out of Food in the Last Year: Never true   Transportation Needs: No Transportation Needs    Lack of Transportation (Medical): No    Lack of Transportation (Non-Medical): No   Physical Activity: Insufficiently Active    Days of Exercise per Week: 3 days    Minutes of Exercise per Session: 40 min   Stress: No Stress Concern Present    Feeling of Stress : Not at all   Social Connections: Unknown    Frequency of Communication with Friends and Family: More than three times a week    Frequency of Social Gatherings with Friends and Family: Once a week    Active Member of Clubs or Organizations: No    Attends Club or Organization Meetings: Never    Marital Status:    Housing Stability: Low Risk     Unable to Pay for Housing in the Last Year: No    Number of Places Lived in the Last Year: 1    Unstable Housing in the Last Year: No

## 2023-07-12 DIAGNOSIS — I10 ESSENTIAL HYPERTENSION: ICD-10-CM

## 2023-07-12 RX ORDER — AMLODIPINE AND VALSARTAN 10; 320 MG/1; MG/1
1 TABLET ORAL DAILY
Qty: 30 TABLET | Refills: 5 | Status: SHIPPED | OUTPATIENT
Start: 2023-07-12 | End: 2024-03-07 | Stop reason: SDUPTHER

## 2023-07-12 RX ORDER — NEBIVOLOL 10 MG/1
10 TABLET ORAL DAILY
Qty: 30 TABLET | Refills: 5 | Status: SHIPPED | OUTPATIENT
Start: 2023-07-12 | End: 2024-03-07 | Stop reason: CLARIF

## 2023-09-09 DIAGNOSIS — Z30.09 STERILIZATION CONSULT: ICD-10-CM

## 2023-09-11 RX ORDER — TADALAFIL 10 MG/1
10 TABLET ORAL DAILY PRN
Qty: 15 TABLET | Refills: 11 | OUTPATIENT
Start: 2023-09-11 | End: 2024-09-10

## 2023-10-26 DIAGNOSIS — Z30.09 STERILIZATION CONSULT: ICD-10-CM

## 2023-10-27 RX ORDER — TADALAFIL 10 MG/1
10 TABLET ORAL DAILY PRN
Qty: 15 TABLET | Refills: 11 | OUTPATIENT
Start: 2023-10-27 | End: 2024-10-26

## 2023-10-29 ENCOUNTER — PATIENT MESSAGE (OUTPATIENT)
Dept: UROLOGY | Facility: CLINIC | Age: 48
End: 2023-10-29
Payer: COMMERCIAL

## 2023-10-29 DIAGNOSIS — Z30.09 STERILIZATION CONSULT: ICD-10-CM

## 2023-10-30 RX ORDER — TADALAFIL 10 MG/1
10 TABLET ORAL DAILY PRN
Qty: 30 TABLET | Refills: 11 | Status: SHIPPED | OUTPATIENT
Start: 2023-10-30 | End: 2024-10-29

## 2024-01-03 ENCOUNTER — PATIENT MESSAGE (OUTPATIENT)
Dept: FAMILY MEDICINE | Facility: CLINIC | Age: 49
End: 2024-01-03

## 2024-03-07 ENCOUNTER — PATIENT MESSAGE (OUTPATIENT)
Dept: FAMILY MEDICINE | Facility: CLINIC | Age: 49
End: 2024-03-07
Payer: COMMERCIAL

## 2024-03-11 ENCOUNTER — OFFICE VISIT (OUTPATIENT)
Dept: FAMILY MEDICINE | Facility: CLINIC | Age: 49
End: 2024-03-11
Payer: COMMERCIAL

## 2024-03-11 VITALS
OXYGEN SATURATION: 98 % | WEIGHT: 200.81 LBS | HEIGHT: 71 IN | HEART RATE: 64 BPM | BODY MASS INDEX: 28.11 KG/M2 | SYSTOLIC BLOOD PRESSURE: 130 MMHG | TEMPERATURE: 98 F | DIASTOLIC BLOOD PRESSURE: 80 MMHG

## 2024-03-11 DIAGNOSIS — Z13.220 SCREENING CHOLESTEROL LEVEL: ICD-10-CM

## 2024-03-11 DIAGNOSIS — Z00.00 ENCOUNTER FOR BLOOD TEST FOR ROUTINE GENERAL PHYSICAL EXAMINATION: ICD-10-CM

## 2024-03-11 DIAGNOSIS — R73.03 PREDIABETES: ICD-10-CM

## 2024-03-11 DIAGNOSIS — D64.9 MILD ANEMIA: ICD-10-CM

## 2024-03-11 DIAGNOSIS — E05.90 SUBCLINICAL HYPERTHYROIDISM: ICD-10-CM

## 2024-03-11 DIAGNOSIS — E61.1 IRON DEFICIENCY: ICD-10-CM

## 2024-03-11 DIAGNOSIS — I10 ESSENTIAL HYPERTENSION: Primary | ICD-10-CM

## 2024-03-11 DIAGNOSIS — R01.1 SYSTOLIC MURMUR: ICD-10-CM

## 2024-03-11 DIAGNOSIS — Z13.1 DIABETES MELLITUS SCREENING: ICD-10-CM

## 2024-03-11 DIAGNOSIS — Z86.010 HISTORY OF COLON POLYPS: ICD-10-CM

## 2024-03-11 PROBLEM — R74.01 ELEVATED AST (SGOT): Status: RESOLVED | Noted: 2022-04-26 | Resolved: 2024-03-11

## 2024-03-11 PROBLEM — Z30.09 STERILIZATION CONSULT: Status: RESOLVED | Noted: 2022-09-02 | Resolved: 2024-03-11

## 2024-03-11 PROBLEM — Z30.2 ENCOUNTER FOR VASECTOMY: Status: RESOLVED | Noted: 2023-01-04 | Resolved: 2024-03-11

## 2024-03-11 PROCEDURE — 99214 OFFICE O/P EST MOD 30 MIN: CPT | Mod: S$GLB,,, | Performed by: NURSE PRACTITIONER

## 2024-03-11 PROCEDURE — 1159F MED LIST DOCD IN RCRD: CPT | Mod: CPTII,S$GLB,, | Performed by: NURSE PRACTITIONER

## 2024-03-11 PROCEDURE — 99999 PR PBB SHADOW E&M-EST. PATIENT-LVL IV: CPT | Mod: PBBFAC,,, | Performed by: NURSE PRACTITIONER

## 2024-03-11 PROCEDURE — 3008F BODY MASS INDEX DOCD: CPT | Mod: CPTII,S$GLB,, | Performed by: NURSE PRACTITIONER

## 2024-03-11 PROCEDURE — 3079F DIAST BP 80-89 MM HG: CPT | Mod: CPTII,S$GLB,, | Performed by: NURSE PRACTITIONER

## 2024-03-11 PROCEDURE — 4010F ACE/ARB THERAPY RXD/TAKEN: CPT | Mod: CPTII,S$GLB,, | Performed by: NURSE PRACTITIONER

## 2024-03-11 PROCEDURE — 3075F SYST BP GE 130 - 139MM HG: CPT | Mod: CPTII,S$GLB,, | Performed by: NURSE PRACTITIONER

## 2024-03-11 PROCEDURE — 1160F RVW MEDS BY RX/DR IN RCRD: CPT | Mod: CPTII,S$GLB,, | Performed by: NURSE PRACTITIONER

## 2024-03-11 PROCEDURE — 3044F HG A1C LEVEL LT 7.0%: CPT | Mod: CPTII,S$GLB,, | Performed by: NURSE PRACTITIONER

## 2024-03-11 NOTE — PROGRESS NOTES
"Subjective:       Patient ID: Salvador Pierce Jr. is a 49 y.o. male.    Chief Complaint: Follow-up (6 months F/U)    Follow-up          Patient is a 49-year-old black male with hypertension, systolic murmur, IFG/Prediabetes, iron-deficiency anemia, an abnormal TSH/subclinical hyperthyroidism noted on wellness exam in June 2018 that had normal thyroid panel with negative antibodies, and personal history of multiple colon polyps that is here today for 6 month follow up with fasting lab results.     Hypertension  currently taking Amlodipine/Valsartan 10/320 mg daily and Bystolic 10 mg daily  Intolerance of Lisinopril due to complaint of Sexual Dysfunction/ED  Insurance NO LONGER covers Bystolic 10 mg daily - will change to Atenolol 50 mg daily and continue the Amlodipine/Valsartan  Recheck in 4 weeks.  /80 (BP Location: Left arm, Patient Position: Sitting, BP Method: Large (Manual))   Pulse 64   Temp 97.9 °F (36.6 °C) (Temporal)   Ht 5' 11" (1.803 m)   Wt 91.1 kg (200 lb 13.4 oz)   SpO2 98%   BMI 28.01 kg/m²              Very mild grade 1/6 systolic murmur   noted as a child and had evaluated by cardiologist in the past without any acute abnormalities.  Stable.     IFG/Prediabetes   with HgbA1C 5.8% in April 2022  Worked on lifestyle modifications   FBG now 97 with HgbA1C down to 5.6%  Stable.           Iron Deficiency Anemia  Saturated iron 12% in April 2022  Started on Ferrous Sulfate 325 mg daily in addition to multivitamin with iron   Saturated iron now 18% and anemia istable.  Continue iron supplement with Vitamin C to aid in absorption  Recheck in 6 months.           Subclinical Hyperthyroidism/Abnormal TSH  Patient had an abnormal TSH on wellness labs in June 2018.    His TSH was mildly low at 0.145 but his free T4 was within normal limits and patient denied any hyperthyroid symptoms at the time.   Subclinical Hyperthyroidsim being monitored.    In July 2019,  Patient had full thyroid " panel and thyroid ultrasound for evaluation.  TSH remained low at 0.116 but rest of T3 and T4 labs are okay.  His thyroid antibodies were also NEGATIVE and his thyroid ultrasound showed no significant abnormality so no further workup is needed unless he becomes symptomatic.    Monitoring thyroid level yearly now   TSH 0.128  Asymptomatic           History of multiple colon polyps  Colonoscopy done 3/17/2022 Dr. Ferrari CRS  Due to repeat in 6 months - done December 2022  Last colonoscopy 12/29/22 - + adenomas - repeat in 3 years.    Lab Visit on 03/09/2024   Component Date Value Ref Range Status    WBC 03/09/2024 7.86  3.90 - 12.70 K/uL Final    RBC 03/09/2024 4.30 (L)  4.60 - 6.20 M/uL Final    Hemoglobin 03/09/2024 13.4 (L)  14.0 - 18.0 g/dL Final    Hematocrit 03/09/2024 40.5  40.0 - 54.0 % Final    MCV 03/09/2024 94  82 - 98 fL Final    MCH 03/09/2024 31.2 (H)  27.0 - 31.0 pg Final    MCHC 03/09/2024 33.1  32.0 - 36.0 g/dL Final    RDW 03/09/2024 14.0  11.5 - 14.5 % Final    Platelets 03/09/2024 179  150 - 450 K/uL Final    MPV 03/09/2024 12.3  9.2 - 12.9 fL Final    Immature Granulocytes 03/09/2024 0.3  0.0 - 0.5 % Final    Gran # (ANC) 03/09/2024 4.0  1.8 - 7.7 K/uL Final    Immature Grans (Abs) 03/09/2024 0.02  0.00 - 0.04 K/uL Final    Comment: Mild elevation in immature granulocytes is non specific and   can be seen in a variety of conditions including stress response,   acute inflammation, trauma and pregnancy. Correlation with other   laboratory and clinical findings is essential.      Lymph # 03/09/2024 2.9  1.0 - 4.8 K/uL Final    Mono # 03/09/2024 0.8  0.3 - 1.0 K/uL Final    Eos # 03/09/2024 0.2  0.0 - 0.5 K/uL Final    Baso # 03/09/2024 0.05  0.00 - 0.20 K/uL Final    nRBC 03/09/2024 0  0 /100 WBC Final    Gran % 03/09/2024 50.5  38.0 - 73.0 % Final    Lymph % 03/09/2024 36.8  18.0 - 48.0 % Final    Mono % 03/09/2024 9.5  4.0 - 15.0 % Final    Eosinophil % 03/09/2024 2.3  0.0 - 8.0 % Final     Basophil % 03/09/2024 0.6  0.0 - 1.9 % Final    Differential Method 03/09/2024 Automated   Final    Iron 03/09/2024 57  45 - 160 ug/dL Final    Transferrin 03/09/2024 215  200 - 375 mg/dL Final    TIBC 03/09/2024 318  250 - 450 ug/dL Final    Saturated Iron 03/09/2024 18 (L)  20 - 50 % Final    Sodium 03/09/2024 147 (H)  136 - 145 mmol/L Final    Potassium 03/09/2024 4.9  3.5 - 5.1 mmol/L Final    Chloride 03/09/2024 111 (H)  95 - 110 mmol/L Final    CO2 03/09/2024 26  23 - 29 mmol/L Final    Glucose 03/09/2024 97  70 - 110 mg/dL Final    BUN 03/09/2024 17  2 - 20 mg/dL Final    Creatinine 03/09/2024 1.13  0.50 - 1.40 mg/dL Final    Calcium 03/09/2024 9.1  8.7 - 10.5 mg/dL Final    Total Protein 03/09/2024 7.5  6.0 - 8.4 g/dL Final    Albumin 03/09/2024 4.3  3.5 - 5.2 g/dL Final    Total Bilirubin 03/09/2024 0.3  0.1 - 1.0 mg/dL Final    Comment: For infants and newborns, interpretation of results should be based  on gestational age, weight and in agreement with clinical  observations.    Premature Infant recommended reference ranges:  Up to 24 hours.............<8.0 mg/dL  Up to 48 hours............<12.0 mg/dL  3-5 days..................<15.0 mg/dL  6-29 days.................<15.0 mg/dL      Alkaline Phosphatase 03/09/2024 52  38 - 126 U/L Final    AST 03/09/2024 28  15 - 46 U/L Final    ALT 03/09/2024 27  10 - 44 U/L Final    Anion Gap 03/09/2024 10  8 - 16 mmol/L Final    eGFR 03/09/2024 >60.0  >60 mL/min/1.73 m^2 Final    Hemoglobin A1C 03/09/2024 5.6  4.0 - 5.6 % Final    Comment: ADA Screening Guidelines:  5.7-6.4%  Consistent with prediabetes  >or=6.5%  Consistent with diabetes    High levels of fetal hemoglobin interfere with the HbA1C  assay. Heterozygous hemoglobin variants (HbS, HgC, etc)do  not significantly interfere with this assay.   However, presence of multiple variants may affect accuracy.      Estimated Avg Glucose 03/09/2024 114  68 - 131 mg/dL Final    TSH 03/09/2024 0.128 (L)  0.400 - 4.000  "uIU/mL Final    Comment: Warning:  Heterophilic antibodies in serum or plasma of   certain individuals are known to cause interference with   immunoassays. These antibodies may be present in blood samples   from individuals regularly exposed to animal or who have been   treated with animal products.     Patients taking high doses of supplemental biotin may have  negatively biased results.       Free T4 03/09/2024 0.97  0.71 - 1.51 ng/dL Final       Review of Systems   HENT: Negative.     Respiratory: Negative.     Cardiovascular: Negative.    Gastrointestinal: Negative.          Objective:     Vitals:    03/11/24 0811   BP: 130/80   BP Location: Left arm   Patient Position: Sitting   BP Method: Large (Manual)   Pulse: 64   Temp: 97.9 °F (36.6 °C)   TempSrc: Temporal   SpO2: 98%   Weight: 91.1 kg (200 lb 13.4 oz)   Height: 5' 11" (1.803 m)          Physical Exam  Constitutional:       General: He is not in acute distress.     Appearance: Normal appearance. He is well-developed. He is not ill-appearing, toxic-appearing or diaphoretic.      Comments: Body mass index is 28.01 kg/m².         HENT:      Head: Normocephalic and atraumatic.      Right Ear: Tympanic membrane and external ear normal.      Left Ear: Tympanic membrane and external ear normal.      Nose: Nose normal.   Eyes:      General: No scleral icterus.        Right eye: No discharge.         Left eye: No discharge.      Extraocular Movements: Extraocular movements intact.      Conjunctiva/sclera: Conjunctivae normal.   Neck:      Thyroid: No thyroid mass or thyromegaly.      Vascular: No JVD.      Trachea: No tracheal deviation.   Cardiovascular:      Rate and Rhythm: Normal rate and regular rhythm.      Heart sounds: Murmur heard.      Systolic murmur is present with a grade of 1/6.      Comments: Grade 1/6 very mild Murmur to right sternal border  Pulmonary:      Effort: Pulmonary effort is normal. No respiratory distress.      Breath sounds: Normal " "breath sounds. No stridor. No wheezing, rhonchi or rales.   Abdominal:      General: There is no distension.   Musculoskeletal:         General: No swelling or deformity. Normal range of motion.      Cervical back: Normal range of motion and neck supple.      Right lower leg: No edema.      Left lower leg: No edema.   Lymphadenopathy:      Cervical: No cervical adenopathy.   Skin:     General: Skin is warm and dry.      Findings: No rash.   Neurological:      Mental Status: He is alert and oriented to person, place, and time.      Coordination: Coordination normal.   Psychiatric:         Mood and Affect: Mood normal.         Behavior: Behavior normal.         Thought Content: Thought content normal.         Judgment: Judgment normal.           Assessment:         ICD-10-CM ICD-9-CM   1. Essential hypertension  I10 401.9   2. Systolic murmur  R01.1 785.2   3. Mild anemia  D64.9 285.9   4. Iron deficiency  E61.1 280.9   5. Subclinical hyperthyroidism  E05.90 242.90   6. Prediabetes  R73.03 790.29   7. History of colon polyps  Z86.010 V12.72   8. Encounter for blood test for routine general physical examination  Z00.00 V72.62   9. Screening cholesterol level  Z13.220 V77.91   10. Diabetes mellitus screening  Z13.1 V77.1       Plan:       1. Essential hypertension  Overview:  currently taking Amlodipine/Valsartan 10/320 mg daily and Bystolic 10 mg daily  Intolerance of Lisinopril due to complaint of Sexual Dysfunction/ED  Insurance NO LONGER covers Bystolic 10 mg daily - will change to Atenolol 50 mg daily and continue the Amlodipine/Valsartan  Recheck in 4 weeks.  /80 (BP Location: Left arm, Patient Position: Sitting, BP Method: Large (Manual))   Pulse 64   Temp 97.9 °F (36.6 °C) (Temporal)   Ht 5' 11" (1.803 m)   Wt 91.1 kg (200 lb 13.4 oz)   SpO2 98%   BMI 28.01      2. Systolic murmur  Overview:  noted as a child and had evaluated by cardiologist in the past without any acute " abnormalities.  Stable.      3. Mild anemia  Overview:  Iron Deficiency Anemia  Saturated iron 12% in April 2022  Started on Ferrous Sulfate 325 mg daily in addition to multivitamin with iron   Saturated iron now 18% and anemia istable.  Continue iron supplement with Vitamin C to aid in absorption  Recheck in 6 months.      Orders:  -     CBC Auto Differential; Future; Expected date: 03/11/2024  -     Iron and TIBC; Future; Expected date: 03/11/2024    4. Iron deficiency  Overview:  Iron Deficiency Anemia  Saturated iron 12% in April 2022  Started on Ferrous Sulfate 325 mg daily in addition to multivitamin with iron   Saturated iron now 18% and anemia istable.  Continue iron supplement with Vitamin C to aid in absorption  Recheck in 6 months.      Orders:  -     CBC Auto Differential; Future; Expected date: 03/11/2024  -     Iron and TIBC; Future; Expected date: 03/11/2024    5. Subclinical hyperthyroidism  Overview:  Patient had an abnormal TSH on wellness labs in June 2018.    His TSH was mildly low at 0.145 but his free T4 was within normal limits and patient denied any hyperthyroid symptoms at the time.   Subclinical Hyperthyroidsim being monitored.    In July 2019,  Patient had full thyroid panel and thyroid ultrasound for evaluation.  TSH remained low at 0.116 but rest of T3 and T4 labs are okay.  His thyroid antibodies were also NEGATIVE and his thyroid ultrasound showed no significant abnormality so no further workup is needed unless he becomes symptomatic.    Monitoring thyroid level yearly now   TSH 0.128  Asymptomatic      Orders:  -     TSH; Future; Expected date: 03/11/2024    6. Prediabetes  Overview:   with HgbA1C 5.8% in April 2022  Worked on lifestyle modifications   FBG now 97 with HgbA1C down to 5.6%  Stable.        7. History of colon polyps  Overview:  Colonoscopy done 3/17/2022 Dr. Ferrari CRS  Due to repeat in 6 months - done December 2022  Last colonoscopy 12/29/22 - + adenomas -  repeat in 3 years.      8. Encounter for blood test for routine general physical examination  -     CBC Auto Differential; Future; Expected date: 03/11/2024  -     Comprehensive Metabolic Panel; Future; Expected date: 03/11/2024  -     Hemoglobin A1C; Future; Expected date: 03/11/2024  -     Lipid Panel; Future; Expected date: 03/11/2024  -     TSH; Future; Expected date: 03/11/2024  -     Iron and TIBC; Future; Expected date: 03/11/2024    9. Screening cholesterol level  -     Lipid Panel; Future; Expected date: 03/11/2024    10. Diabetes mellitus screening  -     Comprehensive Metabolic Panel; Future; Expected date: 03/11/2024  -     Hemoglobin A1C; Future; Expected date: 03/11/2024       Follow up in about 4 weeks (around 4/8/2024) for BP check; fasting labs and wellness exam 6 months..     Patient's Medications   New Prescriptions    No medications on file   Previous Medications    AMLODIPINE-VALSARTAN (EXFORGE)  MG PER TABLET    Take 1 tablet by mouth once daily.    ATENOLOL (TENORMIN) 50 MG TABLET    Take 1 tablet (50 mg total) by mouth once daily.    FERROUS SULFATE (FEOSOL) 325 MG (65 MG IRON) TAB TABLET    Take 325 mg by mouth daily with breakfast.    MULTIVITAMIN CAPSULE    Take 1 capsule by mouth once daily.    TADALAFIL (CIALIS) 10 MG TABLET    Take 1 tablet (10 mg total) by mouth daily as needed for Erectile Dysfunction.   Modified Medications    No medications on file   Discontinued Medications    No medications on file       Past Medical History:   Diagnosis Date    Abnormal TSH 6/20/2018    Patient had a mildly low TSH in June 2018 but normal free T4 and asymtomatic.  Will continue to monitor levels    Colon polyp 3/17/2022    Hypertension     Mild anemia 6/20/2018    noted on wellness - started on multivitamin    Subclinical hyperthyroidism 10/19/2018    TSH remains low at 0.116 but rest of T3 and T4 labs are okay.  His thyroid antibodies are also negative and his thyroid ultrasound showed no  significant abnormality so no further workup is needed unless he becomes symptomatic.  Will monitor thyroid level yearly now.     Systolic murmur     reports noted as a child - had evaluated by cardiologist in the past       Past Surgical History:   Procedure Laterality Date    COLONOSCOPY N/A 3/17/2022    Procedure: COLONOSCOPY;  Surgeon: CARRI Ferrari MD;  Location: [facility];  Service: Endoscopy;  Laterality: N/A;    COLONOSCOPY N/A 2022    Procedure: COLONOSCOPY;  Surgeon: CARRI Ferrari MD;  Location: [facility];  Service: Endoscopy;  Laterality: N/A;  in August or September with me please for retreatment    HERNIA REPAIR      right inguinal hernia at age 12       Family History   Problem Relation Age of Onset    Cancer Mother 67        lung cancer    Hypertension Mother     Multiple sclerosis Mother     Diabetes Father     Hypertension Father     Kidney disease Father     No Known Problems Brother     Early death Neg Hx        Social History     Socioeconomic History    Marital status:    Occupational History    Occupation: supervisory position   Tobacco Use    Smoking status: Former     Current packs/day: 0.00     Average packs/day: 0.5 packs/day for 21.0 years (10.5 ttl pk-yrs)     Types: Cigars, Cigarettes     Start date: 1997     Quit date: 2018     Years since quittin.0     Passive exposure: Never    Smokeless tobacco: Never   Substance and Sexual Activity    Alcohol use: Yes     Alcohol/week: 2.0 standard drinks of alcohol     Types: 2 Cans of beer per week     Comment: mostly on weekends -  either a couple a beers or a couple drinks    Drug use: No    Sexual activity: Yes     Partners: Female     Birth control/protection: I.U.D.     Comment: My wife has IUD     Social Determinants of Health     Financial Resource Strain: Medium Risk (2023)    Overall Financial Resource Strain (CARDIA)     Difficulty of Paying Living Expenses: Somewhat hard   Food Insecurity:  No Food Insecurity (12/2/2023)    Hunger Vital Sign     Worried About Running Out of Food in the Last Year: Never true     Ran Out of Food in the Last Year: Never true   Transportation Needs: No Transportation Needs (12/2/2023)    PRAPARE - Transportation     Lack of Transportation (Medical): No     Lack of Transportation (Non-Medical): No   Physical Activity: Unknown (12/2/2023)    Exercise Vital Sign     Days of Exercise per Week: 0 days     Minutes of Exercise per Session: Patient declined   Stress: No Stress Concern Present (12/2/2023)    [place] [employer] - Occupational Stress Questionnaire     Feeling of Stress : Only a little   Social Connections: Unknown (12/2/2023)    Social Connection and Isolation Panel [NHANES]     Frequency of Communication with Friends and Family: More than three times a week     Frequency of Social Gatherings with Friends and Family: Twice a week     Active Member of Clubs or Organizations: No     Attends Club or Organization Meetings: Patient declined     Marital Status:    Housing Stability: Low Risk  (12/2/2023)    Housing Stability Vital Sign     Unable to Pay for Housing in the Last Year: No     Number of Places Lived in the Last Year: 1     Unstable Housing in the Last Year: No

## 2024-04-03 ENCOUNTER — OFFICE VISIT (OUTPATIENT)
Dept: DERMATOLOGY | Facility: CLINIC | Age: 49
End: 2024-04-03
Payer: COMMERCIAL

## 2024-04-03 DIAGNOSIS — D17.9 LIPOMA, UNSPECIFIED SITE: Primary | ICD-10-CM

## 2024-04-03 PROCEDURE — 99203 OFFICE O/P NEW LOW 30 MIN: CPT | Mod: S$GLB,,, | Performed by: STUDENT IN AN ORGANIZED HEALTH CARE EDUCATION/TRAINING PROGRAM

## 2024-04-03 PROCEDURE — 4010F ACE/ARB THERAPY RXD/TAKEN: CPT | Mod: CPTII,S$GLB,, | Performed by: STUDENT IN AN ORGANIZED HEALTH CARE EDUCATION/TRAINING PROGRAM

## 2024-04-03 PROCEDURE — 3044F HG A1C LEVEL LT 7.0%: CPT | Mod: CPTII,S$GLB,, | Performed by: STUDENT IN AN ORGANIZED HEALTH CARE EDUCATION/TRAINING PROGRAM

## 2024-04-03 PROCEDURE — 99999 PR PBB SHADOW E&M-EST. PATIENT-LVL III: CPT | Mod: PBBFAC,,, | Performed by: STUDENT IN AN ORGANIZED HEALTH CARE EDUCATION/TRAINING PROGRAM

## 2024-04-03 PROCEDURE — 1159F MED LIST DOCD IN RCRD: CPT | Mod: CPTII,S$GLB,, | Performed by: STUDENT IN AN ORGANIZED HEALTH CARE EDUCATION/TRAINING PROGRAM

## 2024-04-03 PROCEDURE — 1160F RVW MEDS BY RX/DR IN RCRD: CPT | Mod: CPTII,S$GLB,, | Performed by: STUDENT IN AN ORGANIZED HEALTH CARE EDUCATION/TRAINING PROGRAM

## 2024-04-03 NOTE — PROGRESS NOTES
Subjective:      Patient ID:  Salvador Pierce Jr. is a 49 y.o. male who presents for   Chief Complaint   Patient presents with    Lesion     Patient with new complaint of lesion(s)  Location: lef forehead  Duration: 4-6 mos  Symptoms: none  Relieving factors/Previous treatments: none       Lesion      Review of Systems   Skin:  Positive for wears hat. Negative for recent sunburn.   Hematologic/Lymphatic: Does not bruise/bleed easily.       Objective:   Physical Exam   Constitutional: He appears well-developed and well-nourished. No distress.   Neurological: He is alert and oriented to person, place, and time. He is not disoriented.   Psychiatric: He has a normal mood and affect.   Skin:   Areas Examined (abnormalities noted in diagram):   Head / Face Inspection Performed            Diagram Legend     Erythematous scaling macule/papule c/w actinic keratosis       Vascular papule c/w angioma      Pigmented verrucoid papule/plaque c/w seborrheic keratosis      Yellow umbilicated papule c/w sebaceous hyperplasia      Irregularly shaped tan macule c/w lentigo     1-2 mm smooth white papules consistent with Milia      Movable subcutaneous cyst with punctum c/w epidermal inclusion cyst      Subcutaneous movable cyst c/w pilar cyst      Firm pink to brown papule c/w dermatofibroma      Pedunculated fleshy papule(s) c/w skin tag(s)      Evenly pigmented macule c/w junctional nevus     Mildly variegated pigmented, slightly irregular-bordered macule c/w mildly atypical nevus      Flesh colored to evenly pigmented papule c/w intradermal nevus       Pink pearly papule/plaque c/w basal cell carcinoma      Erythematous hyperkeratotic cursted plaque c/w SCC      Surgical scar with no sign of skin cancer recurrence      Open and closed comedones      Inflammatory papules and pustules      Verrucoid papule consistent consistent with wart     Erythematous eczematous patches and plaques     Dystrophic onycholytic nail with  subungual debris c/w onychomycosis     Umbilicated papule    Erythematous-base heme-crusted tan verrucoid plaque consistent with inflamed seborrheic keratosis     Erythematous Silvery Scaling Plaque c/w Psoriasis     See annotation      Assessment / Plan:        Lipoma, forehead  -     Ambulatory referral/consult to ENT; Future; Expected date: 04/10/2024    Reassurance given to patient on benign nature.  Discussed treatment options - excision vs observation. If lesion is not treated, it may grow. Discussed RBSE of surgery including scar, infection, bleeding and recurrence. Patient voiced understanding and would like to  proceed with excision. Will refer to ENT given location         Follow up if symptoms worsen or fail to improve.

## 2024-04-11 ENCOUNTER — PATIENT MESSAGE (OUTPATIENT)
Dept: FAMILY MEDICINE | Facility: CLINIC | Age: 49
End: 2024-04-11
Payer: COMMERCIAL

## 2024-04-11 DIAGNOSIS — I10 ESSENTIAL HYPERTENSION: ICD-10-CM

## 2024-04-11 RX ORDER — ATENOLOL 50 MG/1
50 TABLET ORAL DAILY
Qty: 30 TABLET | Refills: 0 | Status: SHIPPED | OUTPATIENT
Start: 2024-04-11 | End: 2024-05-16 | Stop reason: SDUPTHER

## 2024-04-11 RX ORDER — AMLODIPINE AND VALSARTAN 10; 320 MG/1; MG/1
1 TABLET ORAL DAILY
Qty: 30 TABLET | Refills: 0 | Status: SHIPPED | OUTPATIENT
Start: 2024-04-11 | End: 2024-05-16 | Stop reason: SDUPTHER

## 2024-04-22 ENCOUNTER — OFFICE VISIT (OUTPATIENT)
Dept: OTOLARYNGOLOGY | Facility: CLINIC | Age: 49
End: 2024-04-22
Payer: COMMERCIAL

## 2024-04-22 VITALS — BODY MASS INDEX: 27.34 KG/M2 | WEIGHT: 196 LBS

## 2024-04-22 DIAGNOSIS — D17.9 LIPOMA, UNSPECIFIED SITE: ICD-10-CM

## 2024-04-22 PROCEDURE — 3008F BODY MASS INDEX DOCD: CPT | Mod: CPTII,S$GLB,, | Performed by: OTOLARYNGOLOGY

## 2024-04-22 PROCEDURE — 1159F MED LIST DOCD IN RCRD: CPT | Mod: CPTII,S$GLB,, | Performed by: OTOLARYNGOLOGY

## 2024-04-22 PROCEDURE — 99999 PR PBB SHADOW E&M-EST. PATIENT-LVL III: CPT | Mod: PBBFAC,,, | Performed by: OTOLARYNGOLOGY

## 2024-04-22 PROCEDURE — 99203 OFFICE O/P NEW LOW 30 MIN: CPT | Mod: S$GLB,,, | Performed by: OTOLARYNGOLOGY

## 2024-04-22 PROCEDURE — 4010F ACE/ARB THERAPY RXD/TAKEN: CPT | Mod: CPTII,S$GLB,, | Performed by: OTOLARYNGOLOGY

## 2024-04-22 PROCEDURE — 3044F HG A1C LEVEL LT 7.0%: CPT | Mod: CPTII,S$GLB,, | Performed by: OTOLARYNGOLOGY

## 2024-04-22 NOTE — PROGRESS NOTES
Chief Complaint   Patient presents with    lipoma       HPI   49 y.o. male presents for evaluation of a roughly several month history of a left frontal brow lesion.  No complaints.  He was told it is  likely alipoma.  He presents today to discuss treatment options.    Review of Systems   Constitutional: Negative for fatigue and unexpected weight change.   HENT: Per HPI.  Eyes: Negative for visual disturbance.   Respiratory: Negative for shortness of breath, hemoptysis   Cardiovascular: Negative for chest pain and palpitations.   Musculoskeletal: Negative for decreased ROM, back pain.   Skin: Negative for rash, sunburn, itching.   Neurological: Negative for dizziness and seizures.   Hematological: Negative for adenopathy. Does not bruise/bleed easily.   Endocrine: Negative for rapid weight loss/weight gain, heat/cold intolerance.     Past Medical History   Patient Active Problem List   Diagnosis    Essential hypertension    Systolic murmur    Abnormal TSH    Mild anemia    Subclinical hyperthyroidism    Iron deficiency    Prediabetes    Other male erectile dysfunction    History of colon polyps    Lipoma           Past Surgical History   Past Surgical History:   Procedure Laterality Date    COLONOSCOPY N/A 3/17/2022    Procedure: COLONOSCOPY;  Surgeon: CARRI Ferrari MD;  Location: [facility] ENDO;  Service: Endoscopy;  Laterality: N/A;    COLONOSCOPY N/A 12/29/2022    Procedure: COLONOSCOPY;  Surgeon: CARRI Ferrari MD;  Location: [facility] ENDO;  Service: Endoscopy;  Laterality: N/A;  in August or September with me please for retreatment    HERNIA REPAIR      right inguinal hernia at age 12         Family History   Family History   Problem Relation Name Age of Onset    Cancer Mother Mom 67        lung cancer    Hypertension Mother Mom     Multiple sclerosis Mother Mom     Diabetes Father Dad     Hypertension Father Dad     Kidney disease Father Dad     No Known Problems Brother      Early death Neg Hx              Social History   .  Social History     Socioeconomic History    Marital status:    Occupational History    Occupation: supervisory position   Tobacco Use    Smoking status: Former     Current packs/day: 0.00     Average packs/day: 0.5 packs/day for 21.0 years (10.5 ttl pk-yrs)     Types: Cigars, Cigarettes     Start date: 1997     Quit date: 2018     Years since quittin.1     Passive exposure: Never    Smokeless tobacco: Never   Substance and Sexual Activity    Alcohol use: Yes     Alcohol/week: 2.0 standard drinks of alcohol     Types: 2 Cans of beer per week     Comment: mostly on weekends -  either a couple a beers or a couple drinks    Drug use: No    Sexual activity: Yes     Partners: Female     Birth control/protection: I.U.D.     Comment: My wife has IUD     Social Determinants of Health     Financial Resource Strain: Medium Risk (2023)    Overall Financial Resource Strain (CARDIA)     Difficulty of Paying Living Expenses: Somewhat hard   Food Insecurity: No Food Insecurity (2023)    Hunger Vital Sign     Worried About Running Out of Food in the Last Year: Never true     Ran Out of Food in the Last Year: Never true   Transportation Needs: No Transportation Needs (2023)    PRAPARE - Transportation     Lack of Transportation (Medical): No     Lack of Transportation (Non-Medical): No   Physical Activity: Unknown (2023)    Exercise Vital Sign     Days of Exercise per Week: 0 days     Minutes of Exercise per Session: Patient declined   Stress: No Stress Concern Present (2023)    [place] [employer] - Occupational Stress Questionnaire     Feeling of Stress : Only a little   Social Connections: Unknown (2023)    Social Connection and Isolation Panel [NHANES]     Frequency of Communication with Friends and Family: More than three times a week     Frequency of Social Gatherings with Friends and Family: Twice a week     Active Member of  Clubs or Organizations: No     Attends Club or Organization Meetings: Patient declined     Marital Status:    Housing Stability: Low Risk  (12/2/2023)    Housing Stability Vital Sign     Unable to Pay for Housing in the Last Year: No     Number of Places Lived in the Last Year: 1     Unstable Housing in the Last Year: No         Allergies   Review of patient's allergies indicates:  No Known Allergies        Physical Exam     There were no vitals filed for this visit.      Body mass index is 27.34 kg/m².      General: AOx3, NAD   Respiratory:  Symmetric chest rise, normal effort  Neck: No scars.  No cervical lymphadenopathy, thyromegaly or thyroid nodules.  Normal range of motion.    Face: House Brackmann I bilaterally. 1.5 cm soft, mobile L frontal brow lesion c/w lipoma.    Assessment/Plan  Problem List Items Addressed This Visit          Oncology    Lipoma       Brow lipoma.  Will set up for in-office excision.

## 2024-04-30 ENCOUNTER — OFFICE VISIT (OUTPATIENT)
Dept: OTOLARYNGOLOGY | Facility: CLINIC | Age: 49
End: 2024-04-30
Payer: COMMERCIAL

## 2024-04-30 VITALS
WEIGHT: 202.38 LBS | HEART RATE: 65 BPM | HEIGHT: 71 IN | DIASTOLIC BLOOD PRESSURE: 79 MMHG | BODY MASS INDEX: 28.33 KG/M2 | SYSTOLIC BLOOD PRESSURE: 126 MMHG

## 2024-04-30 DIAGNOSIS — D17.9 LIPOMA, UNSPECIFIED SITE: Primary | ICD-10-CM

## 2024-04-30 PROCEDURE — 88304 TISSUE EXAM BY PATHOLOGIST: CPT | Performed by: PATHOLOGY

## 2024-04-30 PROCEDURE — 21555 EXC NECK LES SC < 3 CM: CPT | Mod: S$GLB,,, | Performed by: OTOLARYNGOLOGY

## 2024-04-30 PROCEDURE — 88341 IMHCHEM/IMCYTCHM EA ADD ANTB: CPT | Mod: 26,,, | Performed by: PATHOLOGY

## 2024-04-30 PROCEDURE — 88341 IMHCHEM/IMCYTCHM EA ADD ANTB: CPT | Performed by: PATHOLOGY

## 2024-04-30 PROCEDURE — 99999 PR PBB SHADOW E&M-EST. PATIENT-LVL III: CPT | Mod: PBBFAC,,, | Performed by: OTOLARYNGOLOGY

## 2024-04-30 PROCEDURE — 88342 IMHCHEM/IMCYTCHM 1ST ANTB: CPT | Performed by: PATHOLOGY

## 2024-04-30 PROCEDURE — 88304 TISSUE EXAM BY PATHOLOGIST: CPT | Mod: 26,,, | Performed by: PATHOLOGY

## 2024-04-30 PROCEDURE — 99499 UNLISTED E&M SERVICE: CPT | Mod: S$GLB,,, | Performed by: OTOLARYNGOLOGY

## 2024-04-30 PROCEDURE — 88342 IMHCHEM/IMCYTCHM 1ST ANTB: CPT | Mod: 26,,, | Performed by: PATHOLOGY

## 2024-04-30 NOTE — PROGRESS NOTES
Salvador Colbert Stan Doyle presents for resection of left brow lipoma measuring 2x 2 cm.    Procedure in detail:  Informed consent obtained.  The lesion in question was identified and an approximately 3 cm incision was marked overlying it.  The intended incision was injected with several cc of 1% lidocaine with epinephrine.  The site was prepped and draped in standard sterile fashion.      To begin, the skin was incised with a 15 blade.  Sharp dissection proceeded through the underlying subcutaneous tissue to identify the lesion.  It was consistent with a lipoma.  It was circumferentially dissected free of all surrounding soft tissue attachments.  The subgaleal plane was entered on the deep surface of the lesion was amputated and sent to pathology for permanent analysis.  Hemostasis was achieved with electrocautery.  The wound was then closed with absorbable suture and Dermabond.  He tolerated the procedure well.

## 2024-05-14 LAB
FINAL PATHOLOGIC DIAGNOSIS: NORMAL
GROSS: NORMAL
Lab: NORMAL
MICROSCOPIC EXAM: NORMAL

## 2024-05-16 ENCOUNTER — OFFICE VISIT (OUTPATIENT)
Dept: FAMILY MEDICINE | Facility: CLINIC | Age: 49
End: 2024-05-16
Payer: COMMERCIAL

## 2024-05-16 VITALS
SYSTOLIC BLOOD PRESSURE: 128 MMHG | DIASTOLIC BLOOD PRESSURE: 78 MMHG | HEIGHT: 71 IN | OXYGEN SATURATION: 99 % | TEMPERATURE: 98 F | BODY MASS INDEX: 27.92 KG/M2 | HEART RATE: 72 BPM | WEIGHT: 199.44 LBS

## 2024-05-16 DIAGNOSIS — I10 ESSENTIAL HYPERTENSION: ICD-10-CM

## 2024-05-16 PROCEDURE — 1159F MED LIST DOCD IN RCRD: CPT | Mod: CPTII,S$GLB,, | Performed by: NURSE PRACTITIONER

## 2024-05-16 PROCEDURE — 99999 PR PBB SHADOW E&M-EST. PATIENT-LVL III: CPT | Mod: PBBFAC,,, | Performed by: NURSE PRACTITIONER

## 2024-05-16 PROCEDURE — 3008F BODY MASS INDEX DOCD: CPT | Mod: CPTII,S$GLB,, | Performed by: NURSE PRACTITIONER

## 2024-05-16 PROCEDURE — 1160F RVW MEDS BY RX/DR IN RCRD: CPT | Mod: CPTII,S$GLB,, | Performed by: NURSE PRACTITIONER

## 2024-05-16 PROCEDURE — 3044F HG A1C LEVEL LT 7.0%: CPT | Mod: CPTII,S$GLB,, | Performed by: NURSE PRACTITIONER

## 2024-05-16 PROCEDURE — 3078F DIAST BP <80 MM HG: CPT | Mod: CPTII,S$GLB,, | Performed by: NURSE PRACTITIONER

## 2024-05-16 PROCEDURE — 4010F ACE/ARB THERAPY RXD/TAKEN: CPT | Mod: CPTII,S$GLB,, | Performed by: NURSE PRACTITIONER

## 2024-05-16 PROCEDURE — 99214 OFFICE O/P EST MOD 30 MIN: CPT | Mod: S$GLB,,, | Performed by: NURSE PRACTITIONER

## 2024-05-16 PROCEDURE — 3074F SYST BP LT 130 MM HG: CPT | Mod: CPTII,S$GLB,, | Performed by: NURSE PRACTITIONER

## 2024-05-16 RX ORDER — AMLODIPINE AND VALSARTAN 10; 320 MG/1; MG/1
1 TABLET ORAL DAILY
Qty: 90 TABLET | Refills: 1 | Status: SHIPPED | OUTPATIENT
Start: 2024-05-16 | End: 2025-05-16

## 2024-05-16 RX ORDER — ATENOLOL 50 MG/1
50 TABLET ORAL DAILY
Qty: 90 TABLET | Refills: 1 | Status: SHIPPED | OUTPATIENT
Start: 2024-05-16 | End: 2025-05-16

## 2024-05-16 NOTE — PROGRESS NOTES
"Subjective:       Patient ID: Salvador Pierce Jr. is a 49 y.o. male.    Chief Complaint: Blood Pressure Check (F/U BP)    HPI      Patient is a 49-year-old black male with hypertension, systolic murmur, IFG/Prediabetes, iron-deficiency anemia, an abnormal TSH/subclinical hyperthyroidism noted on wellness exam in June 2018 that had normal thyroid panel with negative antibodies, and personal history of multiple colon polyps that is here today for blood pressure follow up.     Hypertension  currently taking Amlodipine/Valsartan 10/320 mg daily and Atenolol 50 mg daily (changed from Bystolic 10 mg due to formulary change)  Intolerance of Lisinopril due to complaint of Sexual Dysfunction/ED  Blood pressure stable on current medications  /78   Pulse 72   Temp 97.9 °F (36.6 °C) (Temporal)   Ht 5' 11" (1.803 m)   Wt 90.5 kg (199 lb 6.5 oz)   SpO2 99%   BMI 27.81 kg/m²     Rest of chronic problems addressed at March 2024 visit - due to recheck fasting labs and WELLNESS EXAM in September 2024.     Vitals:    05/16/24 0707   BP: 128/78   Pulse: 72   Temp: 97.9 °F (36.6 °C)   TempSrc: Temporal   SpO2: 99%   Weight: 90.5 kg (199 lb 6.5 oz)   Height: 5' 11" (1.803 m)       Assessment:         ICD-10-CM ICD-9-CM   1. Essential hypertension  I10 401.9       Plan:       1. Essential hypertension  Overview:  currently taking Amlodipine/Valsartan 10/320 mg daily and Atenolol 50 mg daily (changed from Bystolic 10 mg due to formulary change)  Intolerance of Lisinopril due to complaint of Sexual Dysfunction/ED  Blood pressure stable on current medications  /78   Pulse 72   Temp 97.9 °F (36.6 °C) (Temporal)   Ht 5' 11" (1.803 m)   Wt 90.5 kg (199 lb 6.5 oz)   SpO2 99%   BMI 27.81 kg/m²     Orders:  -     amlodipine-valsartan (EXFORGE)  mg per tablet; Take 1 tablet by mouth once daily.  Dispense: 90 tablet; Refill: 1  -     atenoloL (TENORMIN) 50 MG tablet; Take 1 tablet (50 mg total) by mouth once daily.  " Dispense: 90 tablet; Refill: 1       Follow up in about 4 months (around 9/16/2024) for fasting labs and WELLNESS EXAM.     Patient's Medications   New Prescriptions    No medications on file   Previous Medications    FERROUS SULFATE (FEOSOL) 325 MG (65 MG IRON) TAB TABLET    Take 325 mg by mouth daily with breakfast.    MULTIVITAMIN CAPSULE    Take 1 capsule by mouth once daily.    TADALAFIL (CIALIS) 10 MG TABLET    Take 1 tablet (10 mg total) by mouth daily as needed for Erectile Dysfunction.   Modified Medications    Modified Medication Previous Medication    AMLODIPINE-VALSARTAN (EXFORGE)  MG PER TABLET amlodipine-valsartan (EXFORGE)  mg per tablet       Take 1 tablet by mouth once daily.    Take 1 tablet by mouth once daily.    ATENOLOL (TENORMIN) 50 MG TABLET atenoloL (TENORMIN) 50 MG tablet       Take 1 tablet (50 mg total) by mouth once daily.    Take 1 tablet (50 mg total) by mouth once daily.   Discontinued Medications    No medications on file         Review of Systems   HENT: Negative.     Respiratory: Negative.     Cardiovascular: Negative.    Gastrointestinal: Negative.          Objective:        Physical Exam  Constitutional:       General: He is not in acute distress.     Appearance: Normal appearance. He is well-developed. He is not ill-appearing, toxic-appearing or diaphoretic.      Comments: Body mass index is 27.81 kg/m².           HENT:      Head: Normocephalic and atraumatic.   Eyes:      Extraocular Movements: Extraocular movements intact.      Conjunctiva/sclera: Conjunctivae normal.   Neck:      Thyroid: No thyroid mass or thyromegaly.      Vascular: No JVD.      Trachea: No tracheal deviation.   Cardiovascular:      Rate and Rhythm: Normal rate and regular rhythm.      Heart sounds: Murmur heard.      Systolic murmur is present with a grade of 1/6.      Comments: Grade 1/6 very mild Murmur to right sternal border  Pulmonary:      Effort: Pulmonary effort is normal. No  respiratory distress.   Abdominal:      General: There is no distension.   Musculoskeletal:         General: Normal range of motion.      Cervical back: Normal range of motion.   Skin:     General: Skin is warm and dry.   Neurological:      Mental Status: He is alert and oriented to person, place, and time.   Psychiatric:         Mood and Affect: Mood normal.         Behavior: Behavior normal.         Thought Content: Thought content normal.         Judgment: Judgment normal.             Past Medical History:   Diagnosis Date    Abnormal TSH 6/20/2018    Patient had a mildly low TSH in June 2018 but normal free T4 and asymtomatic.  Will continue to monitor levels    Colon polyp 3/17/2022    Hypertension     Mild anemia 6/20/2018    noted on wellness - started on multivitamin    Subclinical hyperthyroidism 10/19/2018    TSH remains low at 0.116 but rest of T3 and T4 labs are okay.  His thyroid antibodies are also negative and his thyroid ultrasound showed no significant abnormality so no further workup is needed unless he becomes symptomatic.  Will monitor thyroid level yearly now.     Systolic murmur     reports noted as a child - had evaluated by cardiologist in the past       Past Surgical History:   Procedure Laterality Date    COLONOSCOPY N/A 03/17/2022    Procedure: COLONOSCOPY;  Surgeon: CARRI Ferrari MD;  Location: [facility] ENDO;  Service: Endoscopy;  Laterality: N/A;    COLONOSCOPY N/A 12/29/2022    Procedure: COLONOSCOPY;  Surgeon: CARRI Ferrari MD;  Location: [facility] ENDO;  Service: Endoscopy;  Laterality: N/A;  in August or September with me please for retreatment    HERNIA REPAIR      right inguinal hernia at age 12    LIPOMA RESECTION  05/2024    to left forehead       Family History   Problem Relation Name Age of Onset    Cancer Mother Mom 67        lung cancer    Hypertension Mother Mom     Multiple sclerosis Mother Mom     Diabetes Father Dad     Hypertension Father Dad     Kidney disease  Father Dad     No Known Problems Brother      Early death Neg Hx         Social History     Socioeconomic History    Marital status:    Occupational History    Occupation: supervisory position   Tobacco Use    Smoking status: Former     Current packs/day: 0.00     Average packs/day: 0.5 packs/day for 21.0 years (10.5 ttl pk-yrs)     Types: Cigars, Cigarettes     Start date: 1997     Quit date: 2018     Years since quittin.2     Passive exposure: Never    Smokeless tobacco: Never   Substance and Sexual Activity    Alcohol use: Yes     Alcohol/week: 2.0 standard drinks of alcohol     Types: 2 Cans of beer per week     Comment: mostly on weekends -  either a couple a beers or a couple drinks    Drug use: No    Sexual activity: Yes     Partners: Female     Birth control/protection: I.U.D.     Comment: My wife has IUD     Social Determinants of Health     Financial Resource Strain: Medium Risk (2023)    Overall Financial Resource Strain (CARDIA)     Difficulty of Paying Living Expenses: Somewhat hard   Food Insecurity: No Food Insecurity (2023)    Hunger Vital Sign     Worried About Running Out of Food in the Last Year: Never true     Ran Out of Food in the Last Year: Never true   Transportation Needs: No Transportation Needs (2023)    PRAPARE - Transportation     Lack of Transportation (Medical): No     Lack of Transportation (Non-Medical): No   Physical Activity: Unknown (2023)    Exercise Vital Sign     Days of Exercise per Week: 0 days     Minutes of Exercise per Session: Patient declined   Stress: No Stress Concern Present (2023)    [place] [employer] - Occupational Stress Questionnaire     Feeling of Stress : Only a little   Housing Stability: Low Risk  (2023)    Housing Stability Vital Sign     Unable to Pay for Housing in the Last Year: No     Number of Places Lived in the Last Year: 1     Unstable Housing in the Last Year: No

## 2024-10-01 ENCOUNTER — OFFICE VISIT (OUTPATIENT)
Dept: FAMILY MEDICINE | Facility: CLINIC | Age: 49
End: 2024-10-01
Payer: COMMERCIAL

## 2024-10-01 VITALS
WEIGHT: 193.31 LBS | HEART RATE: 88 BPM | HEIGHT: 71 IN | DIASTOLIC BLOOD PRESSURE: 72 MMHG | OXYGEN SATURATION: 97 % | BODY MASS INDEX: 27.06 KG/M2 | TEMPERATURE: 99 F | SYSTOLIC BLOOD PRESSURE: 118 MMHG

## 2024-10-01 DIAGNOSIS — Z13.0 SCREENING FOR DEFICIENCY ANEMIA: ICD-10-CM

## 2024-10-01 DIAGNOSIS — R73.03 PREDIABETES: ICD-10-CM

## 2024-10-01 DIAGNOSIS — Z23 FLU VACCINE NEED: ICD-10-CM

## 2024-10-01 DIAGNOSIS — R01.1 SYSTOLIC MURMUR: ICD-10-CM

## 2024-10-01 DIAGNOSIS — Z00.00 ENCOUNTER FOR BLOOD TEST FOR ROUTINE GENERAL PHYSICAL EXAMINATION: ICD-10-CM

## 2024-10-01 DIAGNOSIS — D50.9 CHRONIC IRON DEFICIENCY ANEMIA: ICD-10-CM

## 2024-10-01 DIAGNOSIS — Z13.220 SCREENING CHOLESTEROL LEVEL: ICD-10-CM

## 2024-10-01 DIAGNOSIS — Z00.00 ANNUAL PHYSICAL EXAM: Primary | ICD-10-CM

## 2024-10-01 DIAGNOSIS — Z12.5 SCREENING PSA (PROSTATE SPECIFIC ANTIGEN): ICD-10-CM

## 2024-10-01 DIAGNOSIS — I10 ESSENTIAL HYPERTENSION: ICD-10-CM

## 2024-10-01 DIAGNOSIS — E05.90 SUBCLINICAL HYPERTHYROIDISM: ICD-10-CM

## 2024-10-01 DIAGNOSIS — R74.01 ELEVATED AST (SGOT): ICD-10-CM

## 2024-10-01 DIAGNOSIS — Z86.0100 HISTORY OF COLON POLYPS: ICD-10-CM

## 2024-10-01 DIAGNOSIS — Z13.1 DIABETES MELLITUS SCREENING: ICD-10-CM

## 2024-10-01 PROCEDURE — 90471 IMMUNIZATION ADMIN: CPT | Mod: S$GLB,,, | Performed by: NURSE PRACTITIONER

## 2024-10-01 PROCEDURE — 3078F DIAST BP <80 MM HG: CPT | Mod: CPTII,S$GLB,, | Performed by: NURSE PRACTITIONER

## 2024-10-01 PROCEDURE — 3008F BODY MASS INDEX DOCD: CPT | Mod: CPTII,S$GLB,, | Performed by: NURSE PRACTITIONER

## 2024-10-01 PROCEDURE — 1159F MED LIST DOCD IN RCRD: CPT | Mod: CPTII,S$GLB,, | Performed by: NURSE PRACTITIONER

## 2024-10-01 PROCEDURE — 3074F SYST BP LT 130 MM HG: CPT | Mod: CPTII,S$GLB,, | Performed by: NURSE PRACTITIONER

## 2024-10-01 PROCEDURE — 4010F ACE/ARB THERAPY RXD/TAKEN: CPT | Mod: CPTII,S$GLB,, | Performed by: NURSE PRACTITIONER

## 2024-10-01 PROCEDURE — 90656 IIV3 VACC NO PRSV 0.5 ML IM: CPT | Mod: S$GLB,,, | Performed by: NURSE PRACTITIONER

## 2024-10-01 PROCEDURE — 99396 PREV VISIT EST AGE 40-64: CPT | Mod: 25,S$GLB,, | Performed by: NURSE PRACTITIONER

## 2024-10-01 PROCEDURE — 1160F RVW MEDS BY RX/DR IN RCRD: CPT | Mod: CPTII,S$GLB,, | Performed by: NURSE PRACTITIONER

## 2024-10-01 PROCEDURE — 3044F HG A1C LEVEL LT 7.0%: CPT | Mod: CPTII,S$GLB,, | Performed by: NURSE PRACTITIONER

## 2024-10-01 PROCEDURE — 99999 PR PBB SHADOW E&M-EST. PATIENT-LVL IV: CPT | Mod: PBBFAC,,, | Performed by: NURSE PRACTITIONER

## 2024-10-01 RX ORDER — AMLODIPINE AND VALSARTAN 10; 320 MG/1; MG/1
1 TABLET ORAL DAILY
Qty: 90 TABLET | Refills: 3 | Status: SHIPPED | OUTPATIENT
Start: 2024-10-01 | End: 2025-10-01

## 2024-10-01 RX ORDER — ATENOLOL 50 MG/1
50 TABLET ORAL DAILY
Qty: 90 TABLET | Refills: 3 | Status: SHIPPED | OUTPATIENT
Start: 2024-10-01 | End: 2025-10-01

## 2024-10-01 RX ORDER — FERROUS SULFATE 325(65) MG
325 TABLET ORAL
Qty: 90 TABLET | Refills: 3 | Status: SHIPPED | OUTPATIENT
Start: 2024-10-01

## 2024-10-01 NOTE — PROGRESS NOTES
"Subjective:       Patient ID: Salvador Pierce Jr. is a 49 y.o. male.    Chief Complaint: Annual Exam        HPI WITH ASSESSMENT AND PLAN OF CARE:      Patient is a 49-year-old black male with hypertension, systolic murmur, IFG/Prediabetes, iron-deficiency anemia, an abnormal TSH/subclinical hyperthyroidism noted on wellness exam in June 2018 that had normal thyroid panel with negative antibodies, elevated liver enzymes and personal history of multiple colon polyps that is here today for ANNUAL EXAM with fasting lab results.       Hypertension  currently taking Amlodipine/Valsartan 10/320 mg daily and Atenolol 50 mg daily (changed from Bystolic 10 mg due to formulary change)  Intolerance of Lisinopril due to complaint of Sexual Dysfunction/ED  Blood pressure stable on current medications  /72   Pulse 88   Temp 99 °F (37.2 °C) (Temporal)   Ht 5' 11" (1.803 m)   Wt 87.7 kg (193 lb 5.5 oz)   SpO2 97%   BMI 26.97 kg/m²    Recheck 12 months       Very mild grade 1/6 systolic murmur   noted as a child and had evaluated by cardiologist in the past without any acute abnormalities.  Stable.      IFG/Prediabetes   with HgbA1C 5.8% in April 2022  Worked on lifestyle modifications   FBG now 106 with HgbA1C down to 5.5%  Stable  Recheck in 12 months            Iron Deficiency Anemia  Saturated iron 12% in April 2022  Started on Ferrous Sulfate 325 mg daily in addition to multivitamin with iron   3/9/24 Saturated iron 18% and anemia improving.  9/10/24: Anemia improving saturated iron now 34%  Stay on daily supplement  Recheck in 12 months           History of Elevated AST  AST 63 in April 2022  Liver enzymes elevated in October 2022 but now back within normal range 6/8/2023  Stable  Recheck in 12 months            Subclinical Hyperthyroidism/Abnormal TSH  Patient had an abnormal TSH on wellness labs in June 2018.    His TSH was mildly low at 0.145 but his free T4 was within normal limits and patient denied " any hyperthyroid symptoms at the time.   Subclinical Hyperthyroidsim being monitored.    In July 2019,  Patient had full thyroid panel and thyroid ultrasound for evaluation.  TSH remained low at 0.116 but rest of T3 and T4 labs are okay.  His thyroid antibodies were also NEGATIVE and his thyroid ultrasound showed no significant abnormality so no further workup is needed unless he becomes symptomatic.    Monitoring thyroid level yearly now   TSH 0.242 currently   Recheck in 12 months            History of multiple colon polyps  Colonoscopy done 3/17/2022 Dr. Ferrari CRS  Due to repeat in 6 months - done December 2022  Last colonoscopy 12/29/22 - + adenomas - repeat in 3 years.  Due December 2025.        WELLNESS LABS:  CBC mild anemia improving   CMP WNL  Cholesterol WNL  TSH 0.242 - see note above  HgbA1C 5.5%     Health Maintenance:  Flu vaccine today  colonoscopy up to date - due December 2025    Lab Visit on 09/10/2024   Component Date Value Ref Range Status    WBC 09/10/2024 8.57  3.90 - 12.70 K/uL Final    RBC 09/10/2024 4.06 (L)  4.60 - 6.20 M/uL Final    Hemoglobin 09/10/2024 13.2 (L)  14.0 - 18.0 g/dL Final    Hematocrit 09/10/2024 38.3 (L)  40.0 - 54.0 % Final    MCV 09/10/2024 94  82 - 98 fL Final    MCH 09/10/2024 32.5 (H)  27.0 - 31.0 pg Final    MCHC 09/10/2024 34.5  32.0 - 36.0 g/dL Final    RDW 09/10/2024 14.4  11.5 - 14.5 % Final    Platelets 09/10/2024 173  150 - 450 K/uL Final    MPV 09/10/2024 12.1  9.2 - 12.9 fL Final    Immature Granulocytes 09/10/2024 0.5  0.0 - 0.5 % Final    Gran # (ANC) 09/10/2024 4.9  1.8 - 7.7 K/uL Final    Immature Grans (Abs) 09/10/2024 0.04  0.00 - 0.04 K/uL Final    Comment: Mild elevation in immature granulocytes is non specific and   can be seen in a variety of conditions including stress response,   acute inflammation, trauma and pregnancy. Correlation with other   laboratory and clinical findings is essential.      Lymph # 09/10/2024 2.6  1.0 - 4.8 K/uL Final     Mono # 09/10/2024 0.8  0.3 - 1.0 K/uL Final    Eos # 09/10/2024 0.2  0.0 - 0.5 K/uL Final    Baso # 09/10/2024 0.04  0.00 - 0.20 K/uL Final    nRBC 09/10/2024 0  0 /100 WBC Final    Gran % 09/10/2024 57.1  38.0 - 73.0 % Final    Lymph % 09/10/2024 29.9  18.0 - 48.0 % Final    Mono % 09/10/2024 9.7  4.0 - 15.0 % Final    Eosinophil % 09/10/2024 2.8  0.0 - 8.0 % Final    Basophil % 09/10/2024 0.5  0.0 - 1.9 % Final    Differential Method 09/10/2024 Automated   Final    Sodium 09/10/2024 139  136 - 145 mmol/L Final    Potassium 09/10/2024 4.2  3.5 - 5.1 mmol/L Final    Chloride 09/10/2024 107  95 - 110 mmol/L Final    CO2 09/10/2024 23  23 - 29 mmol/L Final    Glucose 09/10/2024 106  70 - 110 mg/dL Final    BUN 09/10/2024 13  6 - 20 mg/dL Final    Creatinine 09/10/2024 1.2  0.5 - 1.4 mg/dL Final    Calcium 09/10/2024 9.2  8.7 - 10.5 mg/dL Final    Total Protein 09/10/2024 7.2  6.0 - 8.4 g/dL Final    Albumin 09/10/2024 4.0  3.5 - 5.2 g/dL Final    Total Bilirubin 09/10/2024 0.2  0.1 - 1.0 mg/dL Final    Comment: For infants and newborns, interpretation of results should be based  on gestational age, weight and in agreement with clinical  observations.    Premature Infant recommended reference ranges:  Up to 24 hours.............<8.0 mg/dL  Up to 48 hours............<12.0 mg/dL  3-5 days..................<15.0 mg/dL  6-29 days.................<15.0 mg/dL      Alkaline Phosphatase 09/10/2024 52 (L)  55 - 135 U/L Final    AST 09/10/2024 24  10 - 40 U/L Final    ALT 09/10/2024 34  10 - 44 U/L Final    eGFR 09/10/2024 >60.0  >60 mL/min/1.73 m^2 Final    Anion Gap 09/10/2024 9  8 - 16 mmol/L Final    Hemoglobin A1C 09/10/2024 5.5  4.0 - 5.6 % Final    Comment: ADA Screening Guidelines:  5.7-6.4%  Consistent with prediabetes  >or=6.5%  Consistent with diabetes    High levels of fetal hemoglobin interfere with the HbA1C  assay. Heterozygous hemoglobin variants (HbS, HgC, etc)do  not significantly interfere with this assay.    However, presence of multiple variants may affect accuracy.      Estimated Avg Glucose 09/10/2024 111  68 - 131 mg/dL Final    Cholesterol 09/10/2024 159  120 - 199 mg/dL Final    Comment: The National Cholesterol Education Program (NCEP) has set the  following guidelines (reference ranges) for Cholesterol:  Optimal.....................<200 mg/dL  Borderline High.............200-239 mg/dL  High........................> or = 240 mg/dL      Triglycerides 09/10/2024 89  30 - 150 mg/dL Final    Comment: The National Cholesterol Education Program (NCEP) has set the  following guidelines (reference values) for triglycerides:  Normal......................<150 mg/dL  Borderline High.............150-199 mg/dL  High........................200-499 mg/dL      HDL 09/10/2024 52  40 - 75 mg/dL Final    Comment: The National Cholesterol Education Program (NCEP) has set the  following guidelines (reference values) for HDL Cholesterol:  Low...............<40 mg/dL  Optimal...........>60 mg/dL      LDL Cholesterol 09/10/2024 89.2  63.0 - 159.0 mg/dL Final    Comment: The National Cholesterol Education Program (NCEP) has set the  following guidelines (reference values) for LDL Cholesterol:  Optimal.......................<130 mg/dL  Borderline High...............130-159 mg/dL  High..........................160-189 mg/dL  Very High.....................>190 mg/dL      HDL/Cholesterol Ratio 09/10/2024 32.7  20.0 - 50.0 % Final    Total Cholesterol/HDL Ratio 09/10/2024 3.1  2.0 - 5.0 Final    Non-HDL Cholesterol 09/10/2024 107  mg/dL Final    Comment: Risk category and Non-HDL cholesterol goals:  Coronary heart disease (CHD)or equivalent (10-year risk of CHD >20%):  Non-HDL cholesterol goal     <130 mg/dL  Two or more CHD risk factors and 10-year risk of CHD <= 20%:  Non-HDL cholesterol goal     <160 mg/dL  0 to 1 CHD risk factor:  Non-HDL cholesterol goal     <190 mg/dL      TSH 09/10/2024 0.242 (L)  0.400 - 4.000 uIU/mL Final    Iron  "09/10/2024 102  45 - 160 ug/dL Final    Transferrin 09/10/2024 204  200 - 375 mg/dL Final    TIBC 09/10/2024 302  250 - 450 ug/dL Final    Saturated Iron 09/10/2024 34  20 - 50 % Final    Free T4 09/10/2024 0.97  0.71 - 1.51 ng/dL Final       Vitals:    10/01/24 1530 10/01/24 1609   BP: 102/80 118/72   BP Location: Left arm    Patient Position: Sitting    Pulse: 88    Temp: 99 °F (37.2 °C)    TempSrc: Temporal    SpO2: 97%    Weight: 87.7 kg (193 lb 5.5 oz)    Height: 5' 11" (1.803 m)          Diagnoses this Encounter:         ICD-10-CM ICD-9-CM   1. Annual physical exam  Z00.00 V70.0   2. Essential hypertension  I10 401.9   3. Systolic murmur  R01.1 785.2   4. Prediabetes  R73.03 790.29   5. Elevated AST (SGOT)  R74.01 790.4   6. Chronic iron deficiency anemia  D50.9 280.9   7. Subclinical hyperthyroidism  E05.90 242.90   8. History of colon polyps  Z86.0100 V12.72   9. Flu vaccine need  Z23 V04.81       Orders Placed This Encounter    CBC auto differential    Comprehensive metabolic panel    LIPID PANEL    HEMOGLOBIN A1C    TSH    IRON AND TIBC    FERRITIN    PSA, Screening    influenza (Flulaval, Fluzone, Fluarix) 45 mcg/0.5 mL IM vaccine (> or = 6 mo) 0.5 mL    amlodipine-valsartan (EXFORGE)  mg per tablet    atenoloL (TENORMIN) 50 MG tablet    ferrous sulfate (FEOSOL) 325 mg (65 mg iron) Tab tablet        Follow up in about 1 year (around 10/1/2025) for fasting labs and WELLNESS EXAM.      Health Maintenance Summary   Full History      Expand All  Collapse All  Postponed - COVID-19 Vaccine   (4 - 2024-25 season)Postponed until 10/1/2025  10/14/2021  Imm Admin: COVID-19, MRNA, LN-S, PF (Pfizer) (Purple Cap)   04/11/2021  Imm Admin: COVID-19, MRNA, LN-S, PF (Pfizer) (Purple Cap)   03/20/2021  Imm Admin: COVID-19, MRNA, LNLiviaS, TJ (Pfizer) (Purple Cap)   Hemoglobin A1c (Prediabetes)   (Yearly)Order placed this encounter  09/10/2024  Hemoglobin A1C External component of Hemoglobin A1C   03/09/2024  Hemoglobin " A1C External component of Hemoglobin A1C   06/08/2023  Hemoglobin A1C External component of Hemoglobin A1C   10/20/2022  Hemoglobin A1C External component of Hemoglobin A1C   04/20/2022  Hemoglobin A1C External component of Hemoglobin A1C   View More History   Colorectal Cancer Screening   (Colonoscopy - Every 3 Years)Next due on 12/29/2025 12/29/2022  Colonoscopy   12/29/2022  Surgical Procedure: COLONOSCOPY   03/17/2022  Colonoscopy   03/17/2022  Surgical Procedure: COLONOSCOPY   TETANUS VACCINE   (Every 10 Years)Next due on 10/3/2028  10/03/2018  Imm Admin: Tdap   Lipid Panel   (Every 5 Years)Order placed this encounter  09/10/2024  Cholesterol Total component of Lipid Panel   10/20/2022  Cholesterol Total component of Lipid Panel   04/20/2022  Cholesterol Total component of Lipid Panel   11/27/2020  Cholesterol Total component of Lipid Panel   06/17/2019  Cholesterol Total component of Lipid panel   View More History   RSV Vaccine (Age 60+ and Pregnant patients)   (1 - 1-dose 75+ series)Next due on 2/15/2050  No completion history exists for this topic.   HIV Screening  Completed  11/27/2020  HIV 1/2 Ag/Ab (4th Gen)   Hepatitis C Screening  Completed  04/20/2022  Hepatitis C Ab component of Hepatitis C Antibody   Influenza Vaccine   (Series Information)Completed  10/01/2024  Imm Admin: Influenza - Trivalent - Fluarix, Flulaval, Fluzone, Afluria - PF   03/11/2024  Declined   11/01/2022  Imm Admin: Influenza - Quadrivalent - PF *Preferred* (6 months and older)   11/17/2021  Imm Admin: Influenza   11/17/2021  Imm Admin: Influenza - Trivalent - Fluarix, Flulaval, Fluzone, Afluria - PF   View More History   Pneumococcal Vaccines (Age 0-64)   (Series Information)Aged Out  No completion history exists for this topic.       Patient's Medications   New Prescriptions    No medications on file   Previous Medications    MULTIVITAMIN CAPSULE    Take 1 capsule by mouth once daily.    TADALAFIL (CIALIS) 10 MG TABLET    Take 1  tablet (10 mg total) by mouth daily as needed for Erectile Dysfunction.   Modified Medications    Modified Medication Previous Medication    AMLODIPINE-VALSARTAN (EXFORGE)  MG PER TABLET amlodipine-valsartan (EXFORGE)  mg per tablet       Take 1 tablet by mouth once daily.    Take 1 tablet by mouth once daily.    ATENOLOL (TENORMIN) 50 MG TABLET atenoloL (TENORMIN) 50 MG tablet       Take 1 tablet (50 mg total) by mouth once daily.    Take 1 tablet (50 mg total) by mouth once daily.    FERROUS SULFATE (FEOSOL) 325 MG (65 MG IRON) TAB TABLET ferrous sulfate (FEOSOL) 325 mg (65 mg iron) Tab tablet       Take 1 tablet (325 mg total) by mouth daily with breakfast.    Take 325 mg by mouth daily with breakfast.   Discontinued Medications    No medications on file         Review of Systems   Constitutional: Negative.    Respiratory: Negative.     Cardiovascular: Negative.    Gastrointestinal: Negative.    Neurological: Negative.    Psychiatric/Behavioral: Negative.           Objective:        Physical Exam  Constitutional:       General: He is not in acute distress.     Appearance: Normal appearance. He is normal weight. He is not ill-appearing or toxic-appearing.      Comments: Body mass index is 26.97 kg/m².     HENT:      Head: Normocephalic.      Right Ear: Tympanic membrane, ear canal and external ear normal.      Left Ear: Tympanic membrane, ear canal and external ear normal.      Nose: Nose normal. No congestion or rhinorrhea.      Mouth/Throat:      Mouth: Mucous membranes are moist.      Pharynx: Oropharynx is clear. No oropharyngeal exudate or posterior oropharyngeal erythema.   Eyes:      General: No scleral icterus.     Extraocular Movements: Extraocular movements intact.      Conjunctiva/sclera: Conjunctivae normal.      Pupils: Pupils are equal, round, and reactive to light.   Cardiovascular:      Rate and Rhythm: Normal rate and regular rhythm.      Pulses: Normal pulses.      Heart sounds:  Normal heart sounds.   Pulmonary:      Effort: Pulmonary effort is normal. No respiratory distress.      Breath sounds: Normal breath sounds. No stridor. No wheezing, rhonchi or rales.   Abdominal:      General: Abdomen is flat. Bowel sounds are normal. There is no distension.      Palpations: Abdomen is soft. There is no mass.      Tenderness: There is no abdominal tenderness. There is no guarding or rebound.      Hernia: No hernia is present.   Musculoskeletal:         General: Normal range of motion.      Cervical back: Normal range of motion.   Neurological:      General: No focal deficit present.      Mental Status: He is alert and oriented to person, place, and time. Mental status is at baseline.   Psychiatric:         Mood and Affect: Mood normal.         Behavior: Behavior normal.         Thought Content: Thought content normal.         Judgment: Judgment normal.             Past Medical History:   Diagnosis Date    Abnormal TSH 6/20/2018    Patient had a mildly low TSH in June 2018 but normal free T4 and asymtomatic.  Will continue to monitor levels    Colon polyp 3/17/2022    Hypertension     Mild anemia 6/20/2018    noted on wellness - started on multivitamin    Subclinical hyperthyroidism 10/19/2018    TSH remains low at 0.116 but rest of T3 and T4 labs are okay.  His thyroid antibodies are also negative and his thyroid ultrasound showed no significant abnormality so no further workup is needed unless he becomes symptomatic.  Will monitor thyroid level yearly now.     Systolic murmur     reports noted as a child - had evaluated by cardiologist in the past       Past Surgical History:   Procedure Laterality Date    COLONOSCOPY N/A 03/17/2022    Procedure: COLONOSCOPY;  Surgeon: CARRI Ferrari MD;  Location: [facility];  Service: Endoscopy;  Laterality: N/A;    COLONOSCOPY N/A 12/29/2022    Procedure: COLONOSCOPY;  Surgeon: CARRI Ferrari MD;  Location: [facility];  Service: Endoscopy;   Laterality: N/A;  in August or September with me please for retreatment    HERNIA REPAIR      right inguinal hernia at age 12    LIPOMA RESECTION  2024    to left forehead       Family History   Problem Relation Name Age of Onset    Cancer Mother Mom 67        lung cancer    Hypertension Mother Mom     Multiple sclerosis Mother Mom     Diabetes Father Dad     Hypertension Father Dad     Kidney disease Father Dad     No Known Problems Brother      Early death Neg Hx         Social History     Socioeconomic History    Marital status:    Occupational History    Occupation: supervisory position   Tobacco Use    Smoking status: Former     Current packs/day: 0.00     Average packs/day: 0.5 packs/day for 21.0 years (10.5 ttl pk-yrs)     Types: Cigars, Cigarettes     Start date: 1997     Quit date: 2018     Years since quittin.6     Passive exposure: Never    Smokeless tobacco: Never   Substance and Sexual Activity    Alcohol use: Yes     Alcohol/week: 2.0 standard drinks of alcohol     Types: 2 Cans of beer per week     Comment: mostly on weekends -  either a couple a beers or a couple drinks    Drug use: No    Sexual activity: Yes     Partners: Female     Birth control/protection: I.U.D.     Comment: My wife has IUD     Social Drivers of Health     Financial Resource Strain: Medium Risk (2023)    Overall Financial Resource Strain (CARDIA)     Difficulty of Paying Living Expenses: Somewhat hard   Food Insecurity: No Food Insecurity (2023)    Hunger Vital Sign     Worried About Running Out of Food in the Last Year: Never true     Ran Out of Food in the Last Year: Never true   Transportation Needs: No Transportation Needs (2023)    PRAPARE - Transportation     Lack of Transportation (Medical): No     Lack of Transportation (Non-Medical): No   Physical Activity: Unknown (2023)    Exercise Vital Sign     Days of Exercise per Week: 0 days     Minutes of Exercise per Session: Patient  declined   Stress: No Stress Concern Present (12/2/2023)    [employer] - Occupational Stress Questionnaire     Feeling of Stress : Only a little   Housing Stability: Low Risk  (12/2/2023)    Housing Stability Vital Sign     Unable to Pay for Housing in the Last Year: No     Number of Places Lived in the Last Year: 1     Unstable Housing in the Last Year: No

## 2024-10-02 PROBLEM — D50.9 CHRONIC IRON DEFICIENCY ANEMIA: Status: ACTIVE | Noted: 2024-10-02

## 2024-11-05 DIAGNOSIS — Z30.09 STERILIZATION CONSULT: ICD-10-CM

## 2024-11-05 RX ORDER — TADALAFIL 10 MG/1
10 TABLET ORAL DAILY PRN
Qty: 30 TABLET | Refills: 11 | OUTPATIENT
Start: 2024-11-05 | End: 2025-11-05

## 2024-11-06 ENCOUNTER — PATIENT MESSAGE (OUTPATIENT)
Dept: UROLOGY | Facility: CLINIC | Age: 49
End: 2024-11-06
Payer: COMMERCIAL

## 2024-11-06 DIAGNOSIS — Z30.09 STERILIZATION CONSULT: ICD-10-CM

## 2024-11-07 RX ORDER — TADALAFIL 10 MG/1
10 TABLET ORAL DAILY PRN
Qty: 30 TABLET | Refills: 11 | OUTPATIENT
Start: 2024-11-07 | End: 2025-11-07

## 2025-06-02 ENCOUNTER — TELEPHONE (OUTPATIENT)
Dept: FAMILY MEDICINE | Facility: CLINIC | Age: 50
End: 2025-06-02
Payer: COMMERCIAL